# Patient Record
Sex: MALE | Race: WHITE | NOT HISPANIC OR LATINO | Employment: FULL TIME | URBAN - NONMETROPOLITAN AREA
[De-identification: names, ages, dates, MRNs, and addresses within clinical notes are randomized per-mention and may not be internally consistent; named-entity substitution may affect disease eponyms.]

---

## 2022-03-31 ENCOUNTER — OFFICE VISIT (OUTPATIENT)
Dept: FAMILY MEDICINE CLINIC | Facility: CLINIC | Age: 52
End: 2022-03-31

## 2022-03-31 VITALS
BODY MASS INDEX: 26.64 KG/M2 | OXYGEN SATURATION: 99 % | SYSTOLIC BLOOD PRESSURE: 126 MMHG | HEIGHT: 72 IN | DIASTOLIC BLOOD PRESSURE: 75 MMHG | HEART RATE: 66 BPM | WEIGHT: 196.7 LBS

## 2022-03-31 DIAGNOSIS — Z00.00 ROUTINE GENERAL MEDICAL EXAMINATION AT A HEALTH CARE FACILITY: Primary | ICD-10-CM

## 2022-03-31 DIAGNOSIS — G47.33 OBSTRUCTIVE SLEEP APNEA SYNDROME: ICD-10-CM

## 2022-03-31 DIAGNOSIS — J30.9 ALLERGIC RHINITIS, UNSPECIFIED SEASONALITY, UNSPECIFIED TRIGGER: ICD-10-CM

## 2022-03-31 DIAGNOSIS — Z13.220 SCREENING CHOLESTEROL LEVEL: ICD-10-CM

## 2022-03-31 DIAGNOSIS — Z12.5 SCREENING FOR PROSTATE CANCER: ICD-10-CM

## 2022-03-31 DIAGNOSIS — Z12.11 SCREENING FOR COLON CANCER: ICD-10-CM

## 2022-03-31 DIAGNOSIS — S76.212A STRAIN OF LEFT GROIN: ICD-10-CM

## 2022-03-31 PROCEDURE — 99386 PREV VISIT NEW AGE 40-64: CPT | Performed by: FAMILY MEDICINE

## 2022-03-31 PROCEDURE — 36415 COLL VENOUS BLD VENIPUNCTURE: CPT | Performed by: FAMILY MEDICINE

## 2022-03-31 RX ORDER — PSEUDOEPHEDRINE HYDROCHLORIDE 120 MG/1
120 TABLET, FILM COATED, EXTENDED RELEASE ORAL 2 TIMES DAILY
COMMUNITY
Start: 2022-02-21

## 2022-03-31 RX ORDER — FLUTICASONE PROPIONATE 93 UG/1
2 SPRAY, METERED NASAL 2 TIMES DAILY
COMMUNITY
Start: 2022-02-09

## 2022-03-31 RX ORDER — LEVOCETIRIZINE DIHYDROCHLORIDE 5 MG/1
5 TABLET, FILM COATED ORAL DAILY
COMMUNITY
Start: 2022-03-02 | End: 2023-03-09 | Stop reason: SDUPTHER

## 2022-03-31 NOTE — PROGRESS NOTES
"Chief Complaint  NEEDS C PAP ORDER FOR SUPPLIES and PULLED GROIN MUSCLE     Subjective          JUSTUS Gage presents to [facility] for preventative yearly exam.   Patient comes in today basically needing to have his CPAP supplies and states that he is strained his left left groin recently  at home and is still sore.  Patient would like to get his blood work done as well patient states that he did also like to do colonoscopy set up and have some blood work done.      Objective   Vital Signs:   /75   Pulse 66   Ht 182.9 cm (72\")   Wt 89.2 kg (196 lb 11.2 oz)   SpO2 99%   BMI 26.68 kg/m²     Body mass index is 26.68 kg/m².    Predictive Model Details   No score data available for Risk of Fall        PHQ-9 Depression Screening  Little interest or pleasure in doing things? 0-->not at all   Feeling down, depressed, or hopeless? 0-->not at all   Trouble falling or staying asleep, or sleeping too much?     Feeling tired or having little energy?     Poor appetite or overeating?     Feeling bad about yourself - or that you are a failure or have let yourself or your family down?     Trouble concentrating on things, such as reading the newspaper or watching television?     Moving or speaking so slowly that other people could have noticed? Or the opposite - being so fidgety or restless that you have been moving around a lot more than usual?     Thoughts that you would be better off dead, or of hurting yourself in some way?     PHQ-9 Total Score 0   If you checked off any problems, how difficult have these problems made it for you to do your work, take care of things at home, or get along with other people?       Immunization History   Administered Date(s) Administered   • COVID-19 (ULICES) 04/09/2021   • Tdap 06/15/2014       Review of Systems   Constitutional: Negative.    HENT: Negative for congestion, dental problem, ear discharge, ear pain and sore throat.    Respiratory: Negative " for apnea, chest tightness and shortness of breath.    Gastrointestinal: Negative for constipation and nausea.   Endocrine: Negative for polyuria.   Genitourinary: Negative for difficulty urinating.   Musculoskeletal: Negative for arthralgias and gait problem.        Tenderness of the left groin region    Skin: Negative for rash.   Hematological: Negative for adenopathy.       Past History:  Medical History: has a past medical history of Pain in limb, Plantar fascial fibromatosis, and Sleep apnea.   Surgical History: has no past surgical history on file.   Family History: family history includes Cancer in his maternal aunt; Heart disease in his father.   Social History: reports that he has never smoked. He has never used smokeless tobacco. Drug use questions deferred to the physician.      Current Outpatient Medications:   •  EQ Sinus 12-Hour 120 MG 12 hr tablet, Take 120 mg by mouth 2 (Two) Times a Day., Disp: , Rfl:   •  levocetirizine (XYZAL) 5 MG tablet, Take 5 mg by mouth Daily., Disp: , Rfl:   •  Xhance 93 MCG/ACT Exhaler Suspension, 2 sprays by Each Nare route 2 (Two) Times a Day. as directed, Disp: , Rfl:     Allergies: Patient has no known allergies.    Physical Exam  Vitals reviewed.   Constitutional:       Appearance: Normal appearance.   HENT:      Head: Normocephalic.      Right Ear: Tympanic membrane, ear canal and external ear normal.      Left Ear: Tympanic membrane, ear canal and external ear normal.      Nose: Nose normal.      Mouth/Throat:      Pharynx: Oropharynx is clear.   Eyes:      Pupils: Pupils are equal, round, and reactive to light.   Cardiovascular:      Rate and Rhythm: Normal rate and regular rhythm.      Pulses: Normal pulses.   Pulmonary:      Effort: Pulmonary effort is normal.      Breath sounds: Normal breath sounds.   Abdominal:      General: Abdomen is flat. Bowel sounds are normal.      Palpations: Abdomen is soft.   Musculoskeletal:         General: Normal range of motion.       Comments: Left groin tender but no distinct hernias or masses noted in the groin or testicle   Skin:     General: Skin is warm and dry.   Neurological:      General: No focal deficit present.      Mental Status: He is alert and oriented to person, place, and time.          Result Review :                   Assessment and Plan    Diagnoses and all orders for this visit:    1. Routine general medical examination at a health care facility (Primary)  Comments:  Get blood work done today and set up colonoscopy and get PSA as well.  Orders:  -     Comprehensive Metabolic Panel; Future  -     Comprehensive Metabolic Panel    2. Screening cholesterol level  Comments:  We will get lipids and monitor: Discussed basically exercise today diet and physical activities as well.  Orders:  -     Lipid Panel; Future  -     Lipid Panel    3. Screening for prostate cancer  Comments:  We will get PSA and monitor  Orders:  -     PSA Screen; Future  -     PSA Screen    4. Obstructive sleep apnea syndrome  Comments:  Gets new cease Pap supplies ordered for patient  Orders:  -     Generic CPAP Order    5. Allergic rhinitis, unspecified seasonality, unspecified trigger  Comments:  Continue over-the-counter medications at this time    6. Screening for colon cancer  Comments:  Get appointment arranged for colonoscopy  Orders:  -     Ambulatory Referral to Vascular Surgery    7. Strain of left groin  Comments:  Peggy no appreciable hernias noted at this time avoid heavy lifting for a week to 10days ibuprofen if needed and monitor.    Updated annual wellness visit checklist.  Immunizations discussed.  Screening up-to-date.  Recommend yearly dental and eye exams. Also discussed monitoring of blood pressure and lipids.            Follow Up   No follow-ups on file.  Patient was given instructions and counseling regarding his condition or for health maintenance advice. Please see specific information pulled into the AVS if appropriate.     Raul  MD Melony

## 2022-04-01 LAB
ALBUMIN SERPL-MCNC: 5.1 G/DL (ref 4–5)
ALBUMIN/GLOB SERPL: 2.4 {RATIO} (ref 1.2–2.2)
ALP SERPL-CCNC: 85 IU/L (ref 44–121)
ALT SERPL-CCNC: 28 IU/L (ref 0–44)
AST SERPL-CCNC: 23 IU/L (ref 0–40)
BILIRUB SERPL-MCNC: 0.9 MG/DL (ref 0–1.2)
BUN SERPL-MCNC: 21 MG/DL (ref 6–24)
BUN/CREAT SERPL: 21 (ref 9–20)
CALCIUM SERPL-MCNC: 10 MG/DL (ref 8.7–10.2)
CHLORIDE SERPL-SCNC: 104 MMOL/L (ref 96–106)
CHOLEST SERPL-MCNC: 165 MG/DL (ref 100–199)
CO2 SERPL-SCNC: 22 MMOL/L (ref 20–29)
CREAT SERPL-MCNC: 1 MG/DL (ref 0.76–1.27)
EGFRCR SERPLBLD CKD-EPI 2021: 92 ML/MIN/1.73
GLOBULIN SER CALC-MCNC: 2.1 G/DL (ref 1.5–4.5)
GLUCOSE SERPL-MCNC: 98 MG/DL (ref 65–99)
HDLC SERPL-MCNC: 47 MG/DL
LDLC SERPL CALC-MCNC: 102 MG/DL (ref 0–99)
POTASSIUM SERPL-SCNC: 5.1 MMOL/L (ref 3.5–5.2)
PROT SERPL-MCNC: 7.2 G/DL (ref 6–8.5)
PSA SERPL-MCNC: 1.3 NG/ML (ref 0–4)
SODIUM SERPL-SCNC: 142 MMOL/L (ref 134–144)
TRIGL SERPL-MCNC: 85 MG/DL (ref 0–149)
VLDLC SERPL CALC-MCNC: 16 MG/DL (ref 5–40)

## 2022-04-06 ENCOUNTER — TELEPHONE (OUTPATIENT)
Dept: FAMILY MEDICINE CLINIC | Facility: CLINIC | Age: 52
End: 2022-04-06

## 2022-04-06 NOTE — TELEPHONE ENCOUNTER
Florencia is requesting last office notes and labs for patient to be faxed to [facility]. Fax: [phone].

## 2022-04-07 ENCOUNTER — TELEPHONE (OUTPATIENT)
Dept: FAMILY MEDICINE CLINIC | Facility: CLINIC | Age: 52
End: 2022-04-07

## 2022-05-24 ENCOUNTER — OFFICE VISIT (OUTPATIENT)
Dept: FAMILY MEDICINE CLINIC | Facility: CLINIC | Age: 52
End: 2022-05-24

## 2022-05-24 VITALS
HEIGHT: 72 IN | DIASTOLIC BLOOD PRESSURE: 86 MMHG | SYSTOLIC BLOOD PRESSURE: 140 MMHG | BODY MASS INDEX: 26.75 KG/M2 | OXYGEN SATURATION: 98 % | HEART RATE: 74 BPM | WEIGHT: 197.5 LBS

## 2022-05-24 DIAGNOSIS — F33.1 MODERATE EPISODE OF RECURRENT MAJOR DEPRESSIVE DISORDER: Primary | ICD-10-CM

## 2022-05-24 PROBLEM — J30.89 NON-SEASONAL ALLERGIC RHINITIS: Status: ACTIVE | Noted: 2022-05-24

## 2022-05-24 PROCEDURE — 99213 OFFICE O/P EST LOW 20 MIN: CPT | Performed by: PHYSICIAN ASSISTANT

## 2022-05-24 NOTE — PROGRESS NOTES
"Chief Complaint  Depression (X3 months. Patient PHQ9 was a 13 today.)    Subjective          JUSTUS Gage presents to [facility]  History of Present Illness patient states he and his wife are having some issues and he was seeing a therapist who has recommended that he start on an antidepressant.  He admits that he has episodes of feeling \"down\" but he really never thought he needed medication.      Objective     Vital Signs:   /86 (BP Location: Right arm, Patient Position: Sitting)   Pulse 74   Ht 182.9 cm (72\")   Wt 89.6 kg (197 lb 8 oz)   SpO2 98%   BMI 26.79 kg/m²     Body mass index is 26.79 kg/m².    Review of Systems   Constitutional: Negative.    HENT: Negative.    Respiratory: Negative.    Cardiovascular: Negative.    Psychiatric/Behavioral: Positive for sleep disturbance, depressed mood and stress. Negative for agitation, behavioral problems, decreased concentration, dysphoric mood, hallucinations, self-injury, suicidal ideas and negative for hyperactivity. The patient is not nervous/anxious.        Social History: reports that he has never smoked. He has never used smokeless tobacco. He reports current alcohol use of about 1.0 standard drink of alcohol per week. He reports that he does not use drugs.      Current Outpatient Medications:   •  levocetirizine (XYZAL) 5 MG tablet, Take 5 mg by mouth Daily., Disp: , Rfl:   •  EQ Sinus 12-Hour 120 MG 12 hr tablet, Take 120 mg by mouth 2 (Two) Times a Day., Disp: , Rfl:   •  sertraline (ZOLOFT) 50 MG tablet, Take 1 tablet by mouth Daily., Disp: 90 tablet, Rfl: 1  •  Xhance 93 MCG/ACT Exhaler Suspension, 2 sprays by Each Nare route 2 (Two) Times a Day. as directed, Disp: , Rfl:     Allergies: Patient has no known allergies.    Physical Exam  Vitals reviewed.   HENT:      Head: Normocephalic.      Nose: Nose normal.      Mouth/Throat:      Mouth: Mucous membranes are moist.   Eyes:      Pupils: Pupils are equal, round, and " reactive to light.   Cardiovascular:      Rate and Rhythm: Normal rate and regular rhythm.      Pulses: Normal pulses.      Heart sounds: Normal heart sounds.   Pulmonary:      Effort: Pulmonary effort is normal.      Breath sounds: Normal breath sounds.   Abdominal:      General: Abdomen is flat. Bowel sounds are normal.      Palpations: Abdomen is soft.   Musculoskeletal:         General: Normal range of motion.      Cervical back: Normal range of motion and neck supple.   Skin:     General: Skin is warm and dry.      Capillary Refill: Capillary refill takes less than 2 seconds.   Neurological:      General: No focal deficit present.      Mental Status: He is alert and oriented to person, place, and time.   Psychiatric:         Mood and Affect: Mood normal.             Assessment and Plan   Diagnoses and all orders for this visit:    1. Moderate episode of recurrent major depressive disorder (HCC) (Primary)  Assessment & Plan:  Patient's depression is single episode and is mild without psychosis. Their depression is currently active and the condition is newly identified. This will be reassessed in 3 months. F/U as described:patient was prescribed an antidepressant medicine.  He will continue to see the therapist as well.       Other orders  -     sertraline (ZOLOFT) 50 MG tablet; Take 1 tablet by mouth Daily.  Dispense: 90 tablet; Refill: 1          Follow Up   Return in about 3 months (around 8/24/2022) for Recheck.  Patient was given instructions and counseling regarding his condition or for health maintenance advice. Please see specific information pulled into the AVS if appropriate.     Shirley Virk PA-C

## 2022-05-24 NOTE — ASSESSMENT & PLAN NOTE
Patient's depression is single episode and is mild without psychosis. Their depression is currently active and the condition is newly identified. This will be reassessed in 3 months. F/U as described:patient was prescribed an antidepressant medicine.  He will continue to see the therapist as well.

## 2023-01-06 ENCOUNTER — OFFICE VISIT (OUTPATIENT)
Dept: FAMILY MEDICINE CLINIC | Facility: CLINIC | Age: 53
End: 2023-01-06
Payer: COMMERCIAL

## 2023-01-06 VITALS
WEIGHT: 203.9 LBS | SYSTOLIC BLOOD PRESSURE: 132 MMHG | HEART RATE: 92 BPM | BODY MASS INDEX: 27.62 KG/M2 | OXYGEN SATURATION: 97 % | TEMPERATURE: 98.2 F | DIASTOLIC BLOOD PRESSURE: 102 MMHG | HEIGHT: 72 IN

## 2023-01-06 DIAGNOSIS — M25.512 ACUTE PAIN OF LEFT SHOULDER: Primary | ICD-10-CM

## 2023-01-06 PROBLEM — M25.511 ACUTE PAIN OF RIGHT SHOULDER: Status: ACTIVE | Noted: 2023-01-06

## 2023-01-06 PROCEDURE — 99213 OFFICE O/P EST LOW 20 MIN: CPT | Performed by: NURSE PRACTITIONER

## 2023-01-06 RX ORDER — VILAZODONE HYDROCHLORIDE 40 MG/1
TABLET ORAL
COMMUNITY
Start: 2022-11-03 | End: 2023-01-31 | Stop reason: SDUPTHER

## 2023-01-06 RX ORDER — LEVOMEFOLATE CALCIUM 15 MG
TABLET ORAL
COMMUNITY
Start: 2022-11-03

## 2023-01-06 NOTE — LETTER
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 Below are the relevant portions of my assessment and plan of care.    If you have questions, please do not hesitate to call me. I look forward to following Kim along with you.         Sincerely,        RAYSHAWN Donahue        CC: No Recipients  Ai Franklin APRN  01/06/23 1343  Incomplete  Chief Complaint  Work Related Injury ([employer] 11/11/2022. Moving a motor, pulling injury to L shoulder.)    Subjective         Kim Gage presents to [facility]  History of Present Illness This is a work injury from [employer].  DOI: 11/11/22 He was pulling a motor on a pallet and had immediate pain in the left shoulder.  He has no previous history of problems with this shoulder.  He has not taken anything for pain and he has not been icing this. He has the most pain with getting out of bed in the morning. He has not been seen for this and has not had imaging.     Objective    Vital Signs:  BP (!) 132/102 (BP Location: Left arm, Patient Position: Sitting, Cuff Size: Adult)   Pulse 92   Temp 98.2 °F (36.8 °C) (Temporal)   Ht 182.9 cm (72\")   Wt 92.5 kg (203 lb 14.4 oz)   SpO2 97%   BMI 27.65 kg/m²   Estimated body mass index is 27.65 kg/m² as calculated from the following:    Height as of this encounter: 182.9 cm (72\").    Weight as of this encounter: 92.5 kg (203 lb 14.4 oz).          Physical Exam  Musculoskeletal:         General: No swelling or tenderness.      Comments: He is not tender over the right shoulder. He has full rom with pain upon fully lifting his right arm above his shoulder.    Neurological:      Mental Status: He is alert and oriented to person, place, and time.        Result Review :               Assessment and  Plan   Diagnoses and all orders for this visit:    1. Acute pain of right shoulder (Primary)  Assessment & Plan:  Light duty:  No lifting greater than 10 lbs, no pushing or pulling. Take NSAIDS at night    Orders:  -     XR Shoulder 2+ View Right (In Office)          Follow Up   Return in about 1 week (around 1/13/2023) for Recheck.  Patient was given instructions and counseling regarding his condition or for health maintenance advice. Please see specific information pulled into the AVS if appropriate.         Ai Franklin, APRN  01/06/23 1337  Incomplete  Chief Complaint  Work Related Injury ([employer] 11/11/2022. Moving a motor, pulling injury to L shoulder.)    Subjective         Kim Gage presents to [facility]  History of Present Illness This is a work injury from [employer].  DOI: 11/11/22 He was pulling a motor on a pallet and had immediate pain in the left shoulder.  He has no previous history of problems with this shoulder.  He has not taken anything for pain and he has not been icing this. He has the most pain with getting out of bed in the morning. He has not been seen for this and has not had imaging.     Objective    Vital Signs:  BP (!) 132/102 (BP Location: Left arm, Patient Position: Sitting, Cuff Size: Adult)   Pulse 92   Temp 98.2 °F (36.8 °C) (Temporal)   Ht 182.9 cm (72\")   Wt 92.5 kg (203 lb 14.4 oz)   SpO2 97%   BMI 27.65 kg/m²   Estimated body mass index is 27.65 kg/m² as calculated from the following:    Height as of this encounter: 182.9 cm (72\").    Weight as of this encounter: 92.5 kg (203 lb 14.4 oz).          Physical Exam   Result Review :               Assessment and Plan   There are no diagnoses linked to this encounter.        Follow Up   No follow-ups on file.  Patient was given instructions and counseling regarding his condition or for health maintenance advice. Please see specific information pulled into the AVS if appropriate.

## 2023-01-06 NOTE — PROGRESS NOTES
Chief Complaint  Work Related Injury ([employer] 11/11/2022. Moving a motor, pulling injury to L shoulder.)    Subjective        Kim Gage presents to [facility]  History of Present Illness This is a work injury from [employer].  DOI: 11/11/22 He was pulling a motor on a pallet and had immediate pain in the left shoulder.  He has no previous history of problems with this shoulder.  He has not taken anything for pain and he has not been icing this. He has the most pain with getting out of bed in the morning. He has not been seen for this and has not had imaging.     Objective   Vital Signs:  BP (!) 132/102 (BP Location: Left arm, Patient Position: Sitting, Cuff Size: Adult)   Pulse 92   Temp 98.2 °F (36.8 °C) (Temporal)   Ht 182.9 cm (72\")   Wt 92.5 kg (203 lb 14.4 oz)   SpO2 97%   BMI 27.65 kg/m²   Estimated body mass index is 27.65 kg/m² as calculated from the following:    Height as of this encounter: 182.9 cm (72\").    Weight as of this encounter: 92.5 kg (203 lb 14.4 oz).          Physical Exam  Musculoskeletal:         General: No swelling or tenderness.      Comments: He is not tender over the left shoulder. He has full rom with pain upon fully lifting his right arm above his shoulder.    Neurological:      Mental Status: He is alert and oriented to person, place, and time.        Result Review :                Assessment and Plan   Diagnoses and all orders for this visit:    1. Acute pain of left shoulder (Primary)  Assessment & Plan:  Light duty:  No lifting greater than 10 lbs, no pushing or pulling and no over head reaching.      Other orders  -     Cancel: XR Shoulder 2+ View Right (In Office)           Follow Up   Return in about 1 week (around 1/13/2023) for Recheck.  Patient was given instructions and counseling regarding his condition or for health maintenance advice. Please see specific information pulled into the AVS if appropriate.

## 2023-01-10 ENCOUNTER — TELEPHONE (OUTPATIENT)
Dept: FAMILY MEDICINE CLINIC | Facility: CLINIC | Age: 53
End: 2023-01-10
Payer: COMMERCIAL

## 2023-01-10 PROBLEM — M25.512 ACUTE PAIN OF LEFT SHOULDER: Status: ACTIVE | Noted: 2023-01-10

## 2023-01-10 PROBLEM — M25.511 ACUTE PAIN OF RIGHT SHOULDER: Status: RESOLVED | Noted: 2023-01-06 | Resolved: 2023-01-10

## 2023-01-10 NOTE — TELEPHONE ENCOUNTER
It looks like the shoulder the order for the Xray was put in for was the R but the L shoulder was injured and the tech did the Xray of the L shoulder. Can you addend the note from 1/6?    I corrected this and called him.    Ai

## 2023-01-10 NOTE — TELEPHONE ENCOUNTER
Caller: Kim Gage    Relationship to patient: Self    Best call back number: [phone]    Patient is needing: TO CLARIFY THE ISSUES HE IS HAVING IS WITH HIS LEFT SHOULDER.  HE  HAD THE XRAY YESTERDAY OF HIS RIGHT SHOULDER.  HE NEEDS PAPERWORK ADJUSTED TO REFLECT THIS CORRECTION

## 2023-01-13 ENCOUNTER — OFFICE VISIT (OUTPATIENT)
Dept: FAMILY MEDICINE CLINIC | Facility: CLINIC | Age: 53
End: 2023-01-13

## 2023-01-13 VITALS
BODY MASS INDEX: 27.09 KG/M2 | WEIGHT: 200 LBS | HEIGHT: 72 IN | DIASTOLIC BLOOD PRESSURE: 94 MMHG | SYSTOLIC BLOOD PRESSURE: 152 MMHG | HEART RATE: 94 BPM | OXYGEN SATURATION: 97 % | TEMPERATURE: 98.2 F

## 2023-01-13 DIAGNOSIS — M25.512 ACUTE PAIN OF LEFT SHOULDER: Primary | ICD-10-CM

## 2023-01-13 RX ORDER — IBUPROFEN 200 MG
400 TABLET ORAL NIGHTLY
COMMUNITY

## 2023-01-13 NOTE — ASSESSMENT & PLAN NOTE
Light duty:  No lifting greater than 10 lbs, no pushing or pulling. He is to continue to take ibuprofen for pain. I want to see him back in 2 weeks.

## 2023-01-13 NOTE — LETTER
"January 13, 2023     Kim Gage  [address]  [place] [address]    Patient: Kim Gage   YOB: 1970   Date of Visit: 1/13/2023       Dear Dr. Gage:    Thank you for referring Kim Gage to me for evaluation. Below are the relevant portions of my assessment and plan of care.    If you have questions, please do not hesitate to call me. I look forward to following Kim along with you.         Sincerely,        RAYSHAWN Donahue        CC: No Recipients  Ai Franklin APRN  01/13/23 1606  Sign when Signing Visit  Chief Complaint  Work Related Injury (L Shoulder injury f/u)    Subjective         Kim Gage presents to [facility]  History of Present Illness  This is a work injury recheck from [employer]. DOI: 11/11/22. He was pulling a motor on a pallet and had immediate pain. He has increased movement this week.  He has been working light duty since this accident. His x-ray showed mainly chronic changes. He is taking ibuprofen for pain.   Objective    Vital Signs:  /94 (BP Location: Left arm, Patient Position: Sitting, Cuff Size: Adult)   Pulse 94   Temp 98.2 °F (36.8 °C) (Temporal)   Ht 182.9 cm (72\")   Wt 90.7 kg (200 lb)   SpO2 97%   BMI 27.12 kg/m²   Estimated body mass index is 27.12 kg/m² as calculated from the following:    Height as of this encounter: 182.9 cm (72\").    Weight as of this encounter: 90.7 kg (200 lb).             Physical Exam  Musculoskeletal:         General: Tenderness present.      Comments: He has increased ROM of the left shoulder.  He has some pain with lifting above his head.         Result Review :      Data reviewed: reviewed his shoulder x-ray            Assessment and Plan   Diagnoses and all orders for this visit:    1. Acute pain of left shoulder (Primary)  Assessment & Plan:  Light duty:  No lifting greater than 10 lbs, no pushing or pulling. He is to continue to take ibuprofen for pain. I " want to see him back in 2 weeks.             Follow Up   Return in about 2 weeks (around 1/27/2023) for Recheck.  Patient was given instructions and counseling regarding his condition or for health maintenance advice. Please see specific information pulled into the AVS if appropriate.

## 2023-01-26 NOTE — ADDENDUM NOTE
Addended by: NIRMAL DANIELS on: 1/26/2023 03:50 PM     Modules accepted: Level of Service, SmartSet

## 2023-01-31 ENCOUNTER — TELEMEDICINE (OUTPATIENT)
Dept: FAMILY MEDICINE CLINIC | Facility: CLINIC | Age: 53
End: 2023-01-31
Payer: COMMERCIAL

## 2023-01-31 VITALS — WEIGHT: 202 LBS | BODY MASS INDEX: 27.36 KG/M2 | HEIGHT: 72 IN

## 2023-01-31 DIAGNOSIS — I10 PRIMARY HYPERTENSION: Primary | ICD-10-CM

## 2023-01-31 DIAGNOSIS — F33.41 RECURRENT MAJOR DEPRESSIVE DISORDER, IN PARTIAL REMISSION: ICD-10-CM

## 2023-01-31 DIAGNOSIS — G47.33 OSA (OBSTRUCTIVE SLEEP APNEA): ICD-10-CM

## 2023-01-31 PROCEDURE — 99213 OFFICE O/P EST LOW 20 MIN: CPT | Performed by: PHYSICIAN ASSISTANT

## 2023-01-31 RX ORDER — AMLODIPINE BESYLATE 5 MG/1
5 TABLET ORAL DAILY
Qty: 90 TABLET | Refills: 1 | Status: SHIPPED | OUTPATIENT
Start: 2023-01-31

## 2023-01-31 RX ORDER — VILAZODONE HYDROCHLORIDE 40 MG/1
40 TABLET ORAL DAILY
Qty: 90 TABLET | Refills: 3 | Status: SHIPPED | OUTPATIENT
Start: 2023-01-31 | End: 2023-03-01 | Stop reason: SDUPTHER

## 2023-01-31 NOTE — ASSESSMENT & PLAN NOTE
Patient has had elevated blood pressure for several months, I am starting him on Amlodipine 5mg and will follow up in 1 month.  He will monitor his BP at home.

## 2023-01-31 NOTE — ASSESSMENT & PLAN NOTE
Patient's depression is fairly well controlled on his current medication, he does not want to make any adjustments.  I am refilling his current dose of Viibryd.

## 2023-01-31 NOTE — PROGRESS NOTES
"Chief Complaint  Depression (Patient needs a refill on his med.)    Subjective  Patient presents during the COVID-19  pandemic/federally declared state of public health emergency.  For today's visit this provider is located at her home in [place], [place]. Patient was seen today through synchronous audio/video technology. Verbal consent was obtained. The patient was located at home. Vitals signs were not obtained due to lack of home monitoring access. Time spent with patient was 20 minutes.        Kim Gage presents to [facility]  History of Present Illness Patient is being seen today to get a refill of his depression medication which had been prescribed by a provider in [place], he would like to get that from us his PCP.  He also needs a letter so he can get his CPAP supplies, and his BP has been elevated at his recent [facility] doctor visits.  He bought a BP cuff yesterday and  Checked his bp it was 138/102.    Objective   Vital Signs:   Ht 182.9 cm (72\")   Wt 91.6 kg (202 lb) Comment: Pt reported viitals  BMI 27.40 kg/m²     Body mass index is 27.4 kg/m².    Review of Systems   Constitutional: Negative for fatigue.   Eyes: Negative for blurred vision.   Respiratory: Negative for cough, chest tightness and shortness of breath.    Cardiovascular: Negative for chest pain, palpitations and leg swelling.   Gastrointestinal: Negative for abdominal pain, constipation, diarrhea, nausea and vomiting.   Neurological: Negative for dizziness, tremors and headache.   Psychiatric/Behavioral: Negative for depressed mood. The patient is not nervous/anxious.        Past History:  Medical History: has a past medical history of Pain in limb, Plantar fascial fibromatosis, and Sleep apnea.   Surgical History: has no past surgical history on file.   Family History: family history includes Cancer in his maternal aunt; Heart disease in his father.   Social History: reports that he has never " smoked. He has never been exposed to tobacco smoke. He has never used smokeless tobacco. He reports current alcohol use of about 1.0 standard drink per week. He reports that he does not use drugs.      Current Outpatient Medications:   •  EQ Sinus 12-Hour 120 MG 12 hr tablet, Take 120 mg by mouth 2 (Two) Times a Day., Disp: , Rfl:   •  ibuprofen (ADVIL,MOTRIN) 200 MG tablet, Take 400 mg by mouth Every Night., Disp: , Rfl:   •  l-methylfolate 15 MG tablet tablet, , Disp: , Rfl:   •  levocetirizine (XYZAL) 5 MG tablet, Take 5 mg by mouth Daily., Disp: , Rfl:   •  vilazodone (VIIBRYD) 40 MG tablet tablet, Take 1 tablet by mouth Daily., Disp: 90 tablet, Rfl: 3  •  Xhance 93 MCG/ACT Exhaler Suspension, 2 sprays by Each Nare route 2 (Two) Times a Day. as directed, Disp: , Rfl:   •  amLODIPine (NORVASC) 5 MG tablet, Take 1 tablet by mouth Daily., Disp: 90 tablet, Rfl: 1    Allergies: Patient has no known allergies.    Physical Exam  Constitutional:       Appearance: Normal appearance.   Neurological:      Mental Status: He is alert and oriented to person, place, and time.   Psychiatric:         Mood and Affect: Mood normal.         Behavior: Behavior normal.          Result Review :                   Assessment and Plan    Diagnoses and all orders for this visit:    1. Primary hypertension (Primary)  Assessment & Plan:  Patient has had elevated blood pressure for several months, I am starting him on Amlodipine 5mg and will follow up in 1 month.  He will monitor his BP at home.       2. Recurrent major depressive disorder, in partial remission (HCC)  Assessment & Plan:  Patient's depression is fairly well controlled on his current medication, he does not want to make any adjustments.  I am refilling his current dose of Viibryd.       3. AVANI (obstructive sleep apnea)  Assessment & Plan:  I will send him a letter to get his CPAP supplies.       Other orders  -     vilazodone (VIIBRYD) 40 MG tablet tablet; Take 1 tablet by  mouth Daily.  Dispense: 90 tablet; Refill: 3  -     amLODIPine (NORVASC) 5 MG tablet; Take 1 tablet by mouth Daily.  Dispense: 90 tablet; Refill: 1      Follow Up   No follow-ups on file.  Patient was given instructions and counseling regarding his condition or for health maintenance advice. Please see specific information pulled into the AVS if appropriate.     Shirley Virk PA-C

## 2023-02-08 ENCOUNTER — TELEPHONE (OUTPATIENT)
Dept: FAMILY MEDICINE CLINIC | Facility: CLINIC | Age: 53
End: 2023-02-08

## 2023-02-08 NOTE — TELEPHONE ENCOUNTER
Caller: Kim Gage    Relationship: Self    Best call back number: [phone]    What medications are you currently taking:   Current Outpatient Medications on File Prior to Visit   Medication Sig Dispense Refill   • amLODIPine (NORVASC) 5 MG tablet Take 1 tablet by mouth Daily. 90 tablet 1   • EQ Sinus 12-Hour 120 MG 12 hr tablet Take 120 mg by mouth 2 (Two) Times a Day.     • ibuprofen (ADVIL,MOTRIN) 200 MG tablet Take 400 mg by mouth Every Night.     • l-methylfolate 15 MG tablet tablet      • levocetirizine (XYZAL) 5 MG tablet Take 5 mg by mouth Daily.     • vilazodone (VIIBRYD) 40 MG tablet tablet Take 1 tablet by mouth Daily. 90 tablet 3   • Xhance 93 MCG/ACT Exhaler Suspension 2 sprays by Each Nare route 2 (Two) Times a Day. as directed       No current facility-administered medications on file prior to visit.      Which medication are you concerned about: vilazodone (VIIBRYD) 40 MG tablet tablet    Who prescribed you this medication: LATONYA    What are your concerns: MEDICATION NEEDS A PRE-AUTHORIZATION.  PATIENT GAVE [phone] AS A NUMBER TO CALL

## 2023-02-10 ENCOUNTER — TELEPHONE (OUTPATIENT)
Dept: FAMILY MEDICINE CLINIC | Facility: CLINIC | Age: 53
End: 2023-02-10
Payer: COMMERCIAL

## 2023-03-01 RX ORDER — VILAZODONE HYDROCHLORIDE 40 MG/1
40 TABLET ORAL DAILY
Qty: 90 TABLET | Refills: 3 | Status: SHIPPED | OUTPATIENT
Start: 2023-03-01

## 2023-03-09 ENCOUNTER — OFFICE VISIT (OUTPATIENT)
Dept: FAMILY MEDICINE CLINIC | Facility: CLINIC | Age: 53
End: 2023-03-09
Payer: COMMERCIAL

## 2023-03-09 VITALS
WEIGHT: 207.7 LBS | HEIGHT: 72 IN | OXYGEN SATURATION: 94 % | HEART RATE: 110 BPM | DIASTOLIC BLOOD PRESSURE: 92 MMHG | BODY MASS INDEX: 28.13 KG/M2 | SYSTOLIC BLOOD PRESSURE: 132 MMHG

## 2023-03-09 DIAGNOSIS — H93.13 TINNITUS OF BOTH EARS: ICD-10-CM

## 2023-03-09 DIAGNOSIS — J32.4 CHRONIC PANSINUSITIS: ICD-10-CM

## 2023-03-09 DIAGNOSIS — L84 CORNS/CALLOSITIES: ICD-10-CM

## 2023-03-09 DIAGNOSIS — J30.1 SEASONAL ALLERGIC RHINITIS DUE TO POLLEN: ICD-10-CM

## 2023-03-09 DIAGNOSIS — I10 PRIMARY HYPERTENSION: Primary | ICD-10-CM

## 2023-03-09 DIAGNOSIS — F33.41 RECURRENT MAJOR DEPRESSIVE DISORDER, IN PARTIAL REMISSION: ICD-10-CM

## 2023-03-09 PROCEDURE — 99214 OFFICE O/P EST MOD 30 MIN: CPT | Performed by: FAMILY MEDICINE

## 2023-03-09 RX ORDER — MONTELUKAST SODIUM 10 MG/1
10 TABLET ORAL NIGHTLY
Qty: 90 TABLET | Refills: 1 | Status: SHIPPED | OUTPATIENT
Start: 2023-03-09

## 2023-03-09 RX ORDER — LEVOCETIRIZINE DIHYDROCHLORIDE 5 MG/1
5 TABLET, FILM COATED ORAL DAILY
Qty: 90 TABLET | Refills: 1 | Status: SHIPPED | OUTPATIENT
Start: 2023-03-09

## 2023-03-09 NOTE — ASSESSMENT & PLAN NOTE
Would like referral back to Dr. Rojo with [facility] orthopedics regarding recurrent foot pain problems and corns

## 2023-03-09 NOTE — ASSESSMENT & PLAN NOTE
Discussed normal TM exam today.  He will set up consultation with ENT for further hearing evaluation and tinnitus work-up.  Discussed not taking NSAIDs to see if this may be contributing

## 2023-03-09 NOTE — ASSESSMENT & PLAN NOTE
Discussed blood pressure elevation today.  He will restart home checks and let us know if it staying over 140/90 for dose adjustment.

## 2023-03-09 NOTE — PROGRESS NOTES
"Chief Complaint  Tinnitus (Ringing in ears for years)    Subjective    History of Present Illness:  Kim Gage is a 52 y.o. male who presents today for problems with left greater than right ear ringing sensation.    Ongoing for the past several years.    On amlodipine for hypertension.  Reports home blood pressure checks usually in the 120s over 80s but occasionally at 90 diastolic.  He will restart home blood pressure checks given mild elevation today let us know if this is staying over 140/90.    On Xyzal and would like to restart Singulair for allergies.  Plans to see ENT for chronic sinusitis problems.    Mood stable with Viibryd.    Objective   Vital Signs:   /92   Pulse 110   Ht 182.9 cm (72\")   Wt 94.2 kg (207 lb 11.2 oz)   SpO2 94%   BMI 28.17 kg/m²     Review of Systems   Constitutional: Negative for appetite change, chills and fever.   HENT: Positive for congestion and tinnitus. Negative for hearing loss.    Eyes: Negative for blurred vision.   Respiratory: Negative for chest tightness.    Cardiovascular: Negative for chest pain.   Gastrointestinal: Negative for abdominal pain.   Musculoskeletal: Negative for gait problem.   Skin: Negative for rash.        Recurrent foot corns and callosities   Psychiatric/Behavioral: Negative for depressed mood.       Past History:  Medical History: has a past medical history of Pain in limb, Plantar fascial fibromatosis, and Sleep apnea.   Surgical History: has no past surgical history on file.   Family History: family history includes Cancer in his maternal aunt; Heart disease in his father.   Social History: reports that he has never smoked. He has never been exposed to tobacco smoke. He has never used smokeless tobacco. He reports current alcohol use of about 1.0 standard drink per week. He reports that he does not use drugs.      Current Outpatient Medications:   •  amLODIPine (NORVASC) 5 MG tablet, Take 1 tablet by mouth Daily., Disp: 90 tablet, " Rfl: 1  •  ibuprofen (ADVIL,MOTRIN) 200 MG tablet, Take 2 tablets by mouth Every Night., Disp: , Rfl:   •  l-methylfolate 15 MG tablet tablet, , Disp: , Rfl:   •  levocetirizine (XYZAL) 5 MG tablet, Take 1 tablet by mouth Daily., Disp: 90 tablet, Rfl: 1  •  vilazodone (VIIBRYD) 40 MG tablet tablet, Take 1 tablet by mouth Daily., Disp: 90 tablet, Rfl: 3  •  Xhance 93 MCG/ACT Exhaler Suspension, 2 sprays by Each Nare route 2 (Two) Times a Day. as directed, Disp: , Rfl:   •  EQ Sinus 12-Hour 120 MG 12 hr tablet, Take 1 tablet by mouth 2 (Two) Times a Day., Disp: , Rfl:   •  montelukast (Singulair) 10 MG tablet, Take 1 tablet by mouth Every Night., Disp: 90 tablet, Rfl: 1    Allergies: Patient has no known allergies.    Physical Exam  Constitutional:       Appearance: Normal appearance.   HENT:      Head: Normocephalic.      Right Ear: Tympanic membrane, ear canal and external ear normal.      Left Ear: Tympanic membrane, ear canal and external ear normal.      Nose: Nose normal.   Eyes:      Pupils: Pupils are equal, round, and reactive to light.   Cardiovascular:      Rate and Rhythm: Normal rate and regular rhythm.      Heart sounds: Normal heart sounds.   Pulmonary:      Effort: Pulmonary effort is normal.      Breath sounds: Normal breath sounds.   Musculoskeletal:         General: Normal range of motion.      Cervical back: Normal range of motion.   Skin:     Comments: Bilateral corns   Neurological:      General: No focal deficit present.      Mental Status: He is alert.   Psychiatric:         Mood and Affect: Mood normal.         Behavior: Behavior normal.         Thought Content: Thought content normal.          Result Review                   Assessment and Plan  Diagnoses and all orders for this visit:    1. Primary hypertension (Primary)  Assessment & Plan:  Discussed blood pressure elevation today.  He will restart home checks and let us know if it staying over 140/90 for dose adjustment.      2. Recurrent  major depressive disorder, in partial remission (HCC)  Assessment & Plan:  Patient's depression is recurrent and is mild without psychosis. Their depression is currently in full remission and the condition is improving with treatment. This will be reassessed at the next regular appointment. F/U as described:patient will continue current medication therapy.      3. Tinnitus of both ears  Assessment & Plan:  Discussed normal TM exam today.  He will set up consultation with ENT for further hearing evaluation and tinnitus work-up.  Discussed not taking NSAIDs to see if this may be contributing      4. Chronic pansinusitis  Assessment & Plan:  Restarted Singulair with Xyzal.  Consultation planned with ENT for sinus surgery options    Orders:  -     levocetirizine (XYZAL) 5 MG tablet; Take 1 tablet by mouth Daily.  Dispense: 90 tablet; Refill: 1  -     montelukast (Singulair) 10 MG tablet; Take 1 tablet by mouth Every Night.  Dispense: 90 tablet; Refill: 1    5. Seasonal allergic rhinitis due to pollen  -     levocetirizine (XYZAL) 5 MG tablet; Take 1 tablet by mouth Daily.  Dispense: 90 tablet; Refill: 1  -     montelukast (Singulair) 10 MG tablet; Take 1 tablet by mouth Every Night.  Dispense: 90 tablet; Refill: 1    6. Corns/callosities  Assessment & Plan:  Would like referral back to Dr. Rojo with [facility] orthopedics regarding recurrent foot pain problems and corns    Orders:  -     Ambulatory Referral to Podiatry      BMI is >= 25 and <30. (Overweight) The following options were offered after discussion;: exercise counseling/recommendations and nutrition counseling/recommendations          Follow Up  Return if symptoms worsen or fail to improve.    Stephen Cesar MD  Answers for HPI/ROS submitted by the patient on 3/9/2023  What is the primary reason for your visit?: Other  Please describe your symptoms.: Ringing in ears  Have you had these symptoms before?: Yes  How long have you been having these  symptoms?: Greater than 2 weeks

## 2023-03-23 ENCOUNTER — TELEPHONE (OUTPATIENT)
Dept: FAMILY MEDICINE CLINIC | Facility: CLINIC | Age: 53
End: 2023-03-23

## 2023-03-23 NOTE — TELEPHONE ENCOUNTER
Caller: Kim Gage    Relationship: Self    Best call back number: [phone]    What is the best time to reach you: ANYTIME    Who are you requesting to speak with (clinical staff, provider,  specific staff member): CLINICAL     What was the call regarding: REFERRAL. HE NEEDS TO KNOW WHERE TO GO. HE'S CONFUSED. PLEASE ADVISE PATIENT TODAY, IF POSSIBLE.    Do you require a callback: YES, PLEASE

## 2023-05-04 ENCOUNTER — OFFICE VISIT (OUTPATIENT)
Dept: FAMILY MEDICINE CLINIC | Facility: CLINIC | Age: 53
End: 2023-05-04
Payer: COMMERCIAL

## 2023-05-04 VITALS — WEIGHT: 207 LBS | HEIGHT: 72 IN | BODY MASS INDEX: 28.04 KG/M2

## 2023-05-22 ENCOUNTER — OFFICE VISIT (OUTPATIENT)
Dept: BEHAVIORAL HEALTH | Facility: CLINIC | Age: 53
End: 2023-05-22
Payer: COMMERCIAL

## 2023-05-22 VITALS
SYSTOLIC BLOOD PRESSURE: 130 MMHG | HEIGHT: 72 IN | HEART RATE: 91 BPM | DIASTOLIC BLOOD PRESSURE: 98 MMHG | BODY MASS INDEX: 28.12 KG/M2 | WEIGHT: 207.6 LBS

## 2023-05-22 DIAGNOSIS — F33.1 MODERATE EPISODE OF RECURRENT MAJOR DEPRESSIVE DISORDER: Primary | ICD-10-CM

## 2023-05-22 DIAGNOSIS — F41.1 GENERALIZED ANXIETY DISORDER: ICD-10-CM

## 2023-05-22 PROCEDURE — 90792 PSYCH DIAG EVAL W/MED SRVCS: CPT | Performed by: NURSE PRACTITIONER

## 2023-05-22 RX ORDER — BUSPIRONE HYDROCHLORIDE 7.5 MG/1
1 TABLET ORAL EVERY 12 HOURS SCHEDULED
COMMUNITY
Start: 2023-03-30 | End: 2023-05-22

## 2023-05-22 RX ORDER — FLUOXETINE 10 MG/1
10 CAPSULE ORAL DAILY
Qty: 30 CAPSULE | Refills: 0 | Status: SHIPPED | OUTPATIENT
Start: 2023-05-22 | End: 2024-05-21

## 2023-05-22 RX ORDER — AMLODIPINE BESYLATE 10 MG/1
1 TABLET ORAL DAILY
COMMUNITY
Start: 2023-04-18

## 2023-05-22 RX ORDER — BUPROPION HYDROCHLORIDE 150 MG/1
150 TABLET ORAL EVERY MORNING
COMMUNITY
Start: 2023-04-17 | End: 2023-05-22

## 2023-05-22 RX ORDER — MIRTAZAPINE 15 MG/1
15 TABLET, FILM COATED ORAL
COMMUNITY
Start: 2023-04-17

## 2023-05-22 RX ORDER — ARIPIPRAZOLE 5 MG/1
1 TABLET ORAL DAILY
COMMUNITY
Start: 2023-03-22 | End: 2023-05-22 | Stop reason: SINTOL

## 2023-05-22 RX ORDER — AMMONIUM LACTATE 12 G/100G
LOTION TOPICAL EVERY 12 HOURS SCHEDULED
COMMUNITY

## 2023-05-22 RX ORDER — OMEPRAZOLE 20 MG/1
CAPSULE, DELAYED RELEASE ORAL
COMMUNITY
Start: 2023-05-05

## 2023-05-22 NOTE — PROGRESS NOTES
New Patient Office Visit      Patient Name: Kim Gaeg  : 1970   MRN: [id number]     Referring Provider: Shirley Virk PA-C    Chief Complaint:      ICD-10-CM ICD-9-CM   1. Moderate episode of recurrent major depressive disorder  F33.1 296.32   2. Generalized anxiety disorder  F41.1 300.02        History of Present Illness:   Kim Gage is a 52 y.o. male who is here today for medication management of depression and anxiety.  Current medication is failing.    Medications: wellbutrin 150mg daily  Therapy: marriage/couples    Subjective      Patient reports current medications failing to improve emotional breakdowns, sadness, depressive symptoms, and increasing anxieties related to marriage separation and rearing of son. Patient is reporting lack of motivation, energy, loss of interest in things he enjoys, disturbed sleep, recent weight gain.  Patient states he and his wife will be starting therapy with a new couples therapist soon.  Patient moved out of the home due to a marriage breakdown 1 year ago and has been struggling emotionally since then.  Patient reports having a Aductionsight test done but doesn't have the results yet, the doctor did call him and tell him to stop the aripiprazole due to the report.  Patient would like to go ahead and start another medication rather than wait on the report because it could be a month.  Patient feels a lot of guilt related to son, feels he and his wife have enabled him to avoid responsibility and maintains a low motivation toward life goals/milestones.    Review of Systems:   Review of Systems   Psychiatric/Behavioral: Positive for dysphoric mood, sleep disturbance and stress. The patient is nervous/anxious.        Past Medical History:   Past Medical History:   Diagnosis Date   • Depression    • Pain in limb    • Plantar fascial fibromatosis    • Sleep apnea        Past Surgical History:   History reviewed. No pertinent surgical history.    Family  History:   Family History   Problem Relation Age of Onset   • Heart disease Father    • Cancer Maternal Aunt        Family Psychiatric History:  Denies    Screening Scores:   PHQ-9 : 14    Psychiatric History:     Previous medications: sertraline, buspirone  Inpatient admissions: no  History of suicide/self harm attempts: denies  Trauma/Abuse History: denies  Developmental History: wnl    RISK ASSESSMENT:    Does patient have intent for suicide? denies  Does patient have thoughts of suicide? denies  Does patient have a current plan for suicide? denies  Access to firearms or weapons: n/a  History of suicide attempts: denies  History of homicidal ideation: denies  Family history of suicide or attempts: denies  Risk Taking/Impulsive Behavior (current or past): denies Describe: None   Protective factors: n/a    Social History:    Living situation: lives alone  Patient's Occupation: woodshop business  Leisure and Recreation: woodworking, spending time with friends and family, outdoor activities  Support system: wife  Illicit substance use: denies  Alcohol use: social  Tobacco use: did not assess    Medications:     Current Outpatient Medications:   •  amLODIPine (NORVASC) 10 MG tablet, Take 1 tablet by mouth Daily., Disp: , Rfl:   •  ammonium lactate (LAC-HYDRIN) 12 % lotion, Every 12 (Twelve) Hours., Disp: , Rfl:   •  buPROPion XL (WELLBUTRIN XL) 150 MG 24 hr tablet, Take 1 tablet by mouth Every Morning., Disp: , Rfl:   •  EQ Sinus 12-Hour 120 MG 12 hr tablet, Take 1 tablet by mouth 2 (Two) Times a Day., Disp: , Rfl:   •  l-methylfolate 15 MG tablet tablet, Take 1 tablet by mouth Daily., Disp: , Rfl:   •  levocetirizine (XYZAL) 5 MG tablet, Take 1 tablet by mouth Daily., Disp: 90 tablet, Rfl: 1  •  mirtazapine (REMERON) 15 MG tablet, Take 1 tablet by mouth every night at bedtime., Disp: , Rfl:   •  montelukast (Singulair) 10 MG tablet, Take 1 tablet by mouth Every Night., Disp: 90 tablet, Rfl: 1  •  omeprazole  "(priLOSEC) 20 MG capsule, TAKE 1 CAPSULE BY MOUTH ONCE DAILY AS DIRECTED 30 MINUTES BEFORE BREAKFAST, Disp: , Rfl:   •  Xhance 93 MCG/ACT Exhaler Suspension, 2 sprays by Each Nare route 2 (Two) Times a Day. as directed, Disp: , Rfl:     Medication Considerations:  DANIS reviewed and appropriate.      Allergies:   No Known Allergies    Objective     Physical Exam:  Vital Signs:   Vitals:    05/22/23 1422   BP: 130/98   Pulse: 91   Weight: 94.2 kg (207 lb 9.6 oz)   Height: 182.9 cm (72\")     Body mass index is 28.16 kg/m².     Mental Status Exam:   Hygiene:   good  Cooperation:  Cooperative  Eye Contact:  Good  Psychomotor Behavior:  Appropriate  Affect:  Appropriate  Mood: sad  Speech:  Normal  Thought Process:  Goal directed and Linear  Thought Content:  Normal  Suicidal:  None  Homicidal:  None  Hallucinations:  None  Delusion:  None  Memory:  Intact  Orientation:  Grossly intact  Reliability:  good  Insight:  Good  Judgement:  Good  Impulse Control:  Good     Therapeutic communications related to relationship, family.       Assessment / Plan      Visit Diagnosis/Orders Placed This Visit:  Diagnoses and all orders for this visit:    1. Moderate episode of recurrent major depressive disorder (Primary)    2. Generalized anxiety disorder         Functional Status: Mild impairment     Prognosis: Good with Ongoing Treatment     Impression/Formulation:  Patient appeared alert and oriented.  Patient is voluntarily requesting to begin outpatient therapy at [facility].  Patient is receptive to assistance with maintaining a stable lifestyle.  Patient presents with history of     ICD-10-CM ICD-9-CM   1. Moderate episode of recurrent major depressive disorder  F33.1 296.32   2. Generalized anxiety disorder  F41.1 300.02   .     Treatment Plan:  Wean off wellbutrin xl-schedule provided.  Start fluoxetine.  Referred for individual therapy to [facility].  Start couples therapy with wife.  Patient " will continue supportive psychotherapy efforts and medications as indicated. Clinic will obtain release of information for current treatment team for continuity of care as needed. Patient will contact this office, call 911 or present to the nearest emergency room should suicidal or homicidal ideations occur. Discussed medication options and treatment plan of prescribed medication(s) as well as the risks, benefits, and potential side effects. Patient ackowledged and verbally consented to continue with current treatment plan and was educated on the importance of compliance with treatment and follow-up appointments.     Follow Up:   Return in about 4 weeks (around 6/19/2023) for Med Check.        RAYSHAWN Daly, ALCIDESP-BC  [facility]     This is electronically signed by RAYSHAWN Daly, ALPHONSO  [unfilled] 15:45 EDT

## 2023-06-15 ENCOUNTER — OFFICE VISIT (OUTPATIENT)
Dept: BEHAVIORAL HEALTH | Facility: CLINIC | Age: 53
End: 2023-06-15
Payer: COMMERCIAL

## 2023-06-15 VITALS — HEART RATE: 103 BPM | DIASTOLIC BLOOD PRESSURE: 108 MMHG | SYSTOLIC BLOOD PRESSURE: 156 MMHG

## 2023-06-15 DIAGNOSIS — F33.1 MODERATE EPISODE OF RECURRENT MAJOR DEPRESSIVE DISORDER: ICD-10-CM

## 2023-06-15 DIAGNOSIS — F41.1 GENERALIZED ANXIETY DISORDER: ICD-10-CM

## 2023-06-15 RX ORDER — FLUOXETINE 10 MG/1
10 CAPSULE ORAL DAILY
Qty: 30 CAPSULE | Refills: 0 | Status: SHIPPED | OUTPATIENT
Start: 2023-06-15 | End: 2024-06-14

## 2023-06-15 NOTE — PROGRESS NOTES
Follow Up Office Visit      Patient Name: Kim Gage  : 1970   MRN: [id number]     Referring Provider: Shirley Virk PA-C    Chief Complaint:      ICD-10-CM ICD-9-CM   1. Generalized anxiety disorder  F41.1 300.02   2. Moderate episode of recurrent major depressive disorder  F33.1 296.32        History of Present Illness:   Kim Gage is a 53 y.o. male who is here today for medication follow up.  Subjective      Patient Reports: I feel like I am doing better.  Less emotional, no breakdowns, Aleena and I are working through things, seeing the couples counselor.  Doing individual and couples therapy.  Started seeing Donna at [facility].  Still lacking some drive to do things. Don't have my friend group I used to have but I am more focused on my son and Aleena right now. Sleep seems to be ok, I will wake up around 3am but am able to go back to sleep.      Review of Systems:   Review of Systems   Psychiatric/Behavioral:  Positive for stress. The patient is nervous/anxious.      Screening Scores:   None today    RISK ASSESSMENT:  Patient denies any thoughts or intent of suicide today. Patient denies any impulsive behavior today.     Medications:     Current Outpatient Medications:     FLUoxetine (PROzac) 10 MG capsule, Take 1 capsule by mouth Daily., Disp: 30 capsule, Rfl: 0    amLODIPine (NORVASC) 10 MG tablet, Take 1 tablet by mouth Daily., Disp: , Rfl:     ammonium lactate (LAC-HYDRIN) 12 % lotion, Every 12 (Twelve) Hours., Disp: , Rfl:     EQ Sinus 12-Hour 120 MG 12 hr tablet, Take 1 tablet by mouth 2 (Two) Times a Day., Disp: , Rfl:     l-methylfolate 15 MG tablet tablet, Take 1 tablet by mouth Daily., Disp: , Rfl:     levocetirizine (XYZAL) 5 MG tablet, Take 1 tablet by mouth Daily., Disp: 90 tablet, Rfl: 1    mirtazapine (REMERON) 15 MG tablet, Take 1 tablet by mouth every night at bedtime., Disp: , Rfl:     montelukast (Singulair) 10 MG tablet, Take 1 tablet by mouth Every Night.,  Disp: 90 tablet, Rfl: 1    omeprazole (priLOSEC) 20 MG capsule, TAKE 1 CAPSULE BY MOUTH ONCE DAILY AS DIRECTED 30 MINUTES BEFORE BREAKFAST, Disp: , Rfl:     Xhance 93 MCG/ACT Exhaler Suspension, 2 sprays by Each Nare route 2 (Two) Times a Day. as directed, Disp: , Rfl:     Medication Considerations:  DANIS reviewed and appropriate.      Allergies:   No Known Allergies    Results Reviewed:   None today     Objective     Physical Exam:  Vital Signs:   Vitals:    06/15/23 1529   BP: (!) 156/108   Pulse: 103     There is no height or weight on file to calculate BMI.     Mental Status Exam:   Hygiene:   good  Cooperation:  Cooperative  Eye Contact:  Good  Psychomotor Behavior:  Appropriate  Affect:  Appropriate  Mood: anxious  Speech:  Pressured  Thought Process:  Goal directed and Linear  Thought Content:  Normal  Suicidal:  None  Homicidal:  None  Hallucinations:  None  Delusion:  None  Memory:  Intact  Orientation:  Grossly intact  Reliability:  good  Insight:  Good  Judgement:  Good  Impulse Control:  Good  Physical/Medical Issues:  No      Assessment / Plan      Visit Diagnosis/Orders Placed This Visit:  Diagnoses and all orders for this visit:    1. Generalized anxiety disorder  -     FLUoxetine (PROzac) 10 MG capsule; Take 1 capsule by mouth Daily.  Dispense: 30 capsule; Refill: 0    2. Moderate episode of recurrent major depressive disorder  -     FLUoxetine (PROzac) 10 MG capsule; Take 1 capsule by mouth Daily.  Dispense: 30 capsule; Refill: 0         Functional Status: No impairment    Prognosis: Good with Ongoing Treatment     Impression/Formulation:  Patient appeared alert and oriented.  Patient is receptive to assistance with maintaining a stable lifestyle.  Patient presents with history of     ICD-10-CM ICD-9-CM   1. Generalized anxiety disorder  F41.1 300.02   2. Moderate episode of recurrent major depressive disorder  F33.1 296.32     Patient has seen an improvement with emotions and mood, although  motivation is still lacking.  He does not feel he needs an increase of medication at this time. Patient is proactively working with an individual and couples therapist.    Treatment Plan:     Continue fluoxetine.  Continue couples and individual therapy.  Patient will continue supportive psychotherapy efforts and medications as indicated. Clinic will obtain release of information for current treatment team for continuity of care as needed. Patient will contact this office, call 911 or present to the nearest emergency room should suicidal or homicidal ideations occur.  Discussed medication options and treatment plan of prescribed medication(s) as well as the risks, benefits, and potential side effects. Patient ackowledged and verbally consented to continue with current treatment plan and was educated on the importance of compliance with treatment and follow-up appointments.     Follow Up:   Return in about 4 weeks (around 7/13/2023) for Med Check.        RAYSHAWN Daly, ALCIDESP-BC  [facility]     This is electronically signed by RAYSHAWN Daly, ALPHONSO  [unfilled] 15:37 EDT

## 2023-10-11 DIAGNOSIS — J30.1 SEASONAL ALLERGIC RHINITIS DUE TO POLLEN: ICD-10-CM

## 2023-10-11 DIAGNOSIS — J32.4 CHRONIC PANSINUSITIS: ICD-10-CM

## 2023-10-12 RX ORDER — LEVOCETIRIZINE DIHYDROCHLORIDE 5 MG/1
5 TABLET, FILM COATED ORAL DAILY
Qty: 90 TABLET | Refills: 0 | Status: SHIPPED | OUTPATIENT
Start: 2023-10-12

## 2024-01-15 ENCOUNTER — OFFICE VISIT (OUTPATIENT)
Dept: BEHAVIORAL HEALTH | Facility: CLINIC | Age: 54
End: 2024-01-15
Payer: COMMERCIAL

## 2024-01-15 VITALS — HEART RATE: 96 BPM | SYSTOLIC BLOOD PRESSURE: 138 MMHG | OXYGEN SATURATION: 97 % | DIASTOLIC BLOOD PRESSURE: 88 MMHG

## 2024-01-15 DIAGNOSIS — F41.1 GENERALIZED ANXIETY DISORDER: Primary | ICD-10-CM

## 2024-01-15 DIAGNOSIS — F33.42 RECURRENT MAJOR DEPRESSIVE DISORDER, IN FULL REMISSION: ICD-10-CM

## 2024-01-15 DIAGNOSIS — G47.20 CIRCADIAN RHYTHM SLEEP DISORDER, UNSPECIFIED TYPE: ICD-10-CM

## 2024-01-15 PROCEDURE — 99214 OFFICE O/P EST MOD 30 MIN: CPT | Performed by: NURSE PRACTITIONER

## 2024-01-15 RX ORDER — MIRTAZAPINE 15 MG/1
15 TABLET, FILM COATED ORAL NIGHTLY PRN
Qty: 30 TABLET | Refills: 1 | Status: SHIPPED | OUTPATIENT
Start: 2024-01-15

## 2024-01-15 NOTE — PROGRESS NOTES
Follow Up Office Visit      Patient Name: Kim Gage  : 1970   MRN: [id number]     Referring Provider: Shirley Virk PA-C    Chief Complaint:      ICD-10-CM ICD-9-CM   1. Generalized anxiety disorder  F41.1 300.02   2. Circadian rhythm sleep disorder, unspecified type  G47.20 327.30   3. Recurrent major depressive disorder, in full remission  F33.42 296.36        History of Present Illness:   Kim Gage is a 53 y.o. male who is here today for follow up with psychiatric medications.    Subjective      Patient Reports:     Doing mostly fine without the fluoxetine.  I ran out last October, weaned myself off, things had improved at home so I didn't think I really needed it anymore and thought I would just try to go without.  Have had some emotional incidents but overall doing well.  Planning to start seeing Edmund Neves again for therapy  Moved back home, relationship with wife is going way better than it used to be.  Son is a source of stress, I can only do so much.   Still taking the sleep med on an as needed but haven't needed lately.    Review of Systems:   Review of Systems   Psychiatric/Behavioral:  Positive for stress.      RISK ASSESSMENT:  Patient denies any high risk factors today.    Medications:     Current Outpatient Medications:     amLODIPine (NORVASC) 10 MG tablet, Take 1 tablet by mouth Daily., Disp: , Rfl:     ammonium lactate (LAC-HYDRIN) 12 % lotion, Every 12 (Twelve) Hours., Disp: , Rfl:     EQ Sinus 12-Hour 120 MG 12 hr tablet, Take 1 tablet by mouth 2 (Two) Times a Day., Disp: , Rfl:     l-methylfolate 15 MG tablet tablet, Take 1 tablet by mouth Daily., Disp: , Rfl:     levocetirizine (XYZAL) 5 MG tablet, Take 1 tablet by mouth once daily, Disp: 90 tablet, Rfl: 0    mirtazapine (REMERON) 15 MG tablet, Take 1 tablet by mouth At Night As Needed (sleep)., Disp: 30 tablet, Rfl: 1    montelukast (Singulair) 10 MG tablet, Take 1 tablet by mouth Every Night., Disp: 90 tablet,  Rfl: 1    omeprazole (priLOSEC) 20 MG capsule, TAKE 1 CAPSULE BY MOUTH ONCE DAILY AS DIRECTED 30 MINUTES BEFORE BREAKFAST, Disp: , Rfl:     Xhance 93 MCG/ACT Exhaler Suspension, 2 sprays by Each Nare route 2 (Two) Times a Day. as directed, Disp: , Rfl:     Medication Considerations:  DANIS reviewed and appropriate.      Allergies:   No Known Allergies    Results Reviewed:   PHQ 2:0     Objective     Physical Exam:  Vital Signs:   Vitals:    01/15/24 0855   BP: 138/88   Pulse: 96   SpO2: 97%     There is no height or weight on file to calculate BMI.     Mental Status Exam:   Hygiene:   good  Cooperation:  Cooperative  Eye Contact:  Good  Psychomotor Behavior:  Appropriate  Affect:  Appropriate  Mood: normal  Speech:  Normal  Thought Process:  Goal directed and Linear  Thought Content:  Normal  Suicidal:  None  Homicidal:  None  Hallucinations:  None  Delusion:  None  Memory:  Intact  Orientation:  Grossly intact  Reliability:  good  Insight:  Good  Judgement:  Good  Impulse Control:  Good  Physical/Medical Issues:   nothing new reported      Assessment / Plan      Visit Diagnosis/Orders Placed This Visit:  Diagnoses and all orders for this visit:    1. Generalized anxiety disorder (Primary)  -     mirtazapine (REMERON) 15 MG tablet; Take 1 tablet by mouth At Night As Needed (sleep).  Dispense: 30 tablet; Refill: 1    2. Circadian rhythm sleep disorder, unspecified type  -     mirtazapine (REMERON) 15 MG tablet; Take 1 tablet by mouth At Night As Needed (sleep).  Dispense: 30 tablet; Refill: 1    3. Recurrent major depressive disorder, in full remission         Functional Status: No impairment    Prognosis: Good with Ongoing Treatment     Impression/Formulation:  Patient appeared alert and oriented. Patient is receptive to assistance with maintaining a stable lifestyle.  Patient presents with history of     ICD-10-CM ICD-9-CM   1. Generalized anxiety disorder  F41.1 300.02   2. Circadian rhythm sleep disorder,  unspecified type  G47.20 327.30   3. Recurrent major depressive disorder, in full remission  F33.42 296.36     Patient reports improvement with some life stressors, struggles with son relationship.  Sleep is improved, rarely uses mirtazapine.  Plans to start individual therapy again with Edmund Neves.      Treatment Plan:   Continue mirtazapine prn  D/C fluoxetine-pt weaned self off in October 2023  Start therapy with Edmund Neevs.  Patient will continue supportive psychotherapy efforts and medications as indicated. Clinic will obtain release of information for current treatment team for continuity of care as needed. Patient will contact this office, call 911 or present to the nearest emergency room should suicidal or homicidal ideations occur.  Discussed medication options and treatment plan of prescribed medication(s) as well as the risks, benefits, and potential side effects. Patient ackowledged and verbally consented to continue with current treatment plan and was educated on the importance of compliance with treatment and follow-up appointments.     Follow Up:   Return in about 6 months (around 7/15/2024) for Med Check.        RAYSHAWN Daly, ALCIDESP-BC  [facility]     This is electronically signed by RAYSHAWN Daly, ALPHONSO  [unfilled] 10:02 EST

## 2024-01-22 ENCOUNTER — OFFICE VISIT (OUTPATIENT)
Dept: BEHAVIORAL HEALTH | Facility: CLINIC | Age: 54
End: 2024-01-22
Payer: COMMERCIAL

## 2024-01-22 DIAGNOSIS — F33.1 MODERATE EPISODE OF RECURRENT MAJOR DEPRESSIVE DISORDER: Primary | ICD-10-CM

## 2024-01-22 PROCEDURE — 90834 PSYTX W PT 45 MINUTES: CPT | Performed by: SOCIAL WORKER

## 2024-01-22 NOTE — PROGRESS NOTES
"     Initial Adult Note     Date:2024   Patient Name: Kim Gage  : 1970   MRN: [id number]   Time IN: 8:45 am    Time OUT: 9:30 am     Referring Provider: Shirley Virk PA-C    Chief Complaint:      ICD-10-CM ICD-9-CM   1. Moderate episode of recurrent major depressive disorder  F33.1 296.32        History of Present Illness:   Kim Gage is a 53 y.o. male who presents to clinic today for Psychotherapy.  Aleena, wife. Everything is good with Aleena.  Deangelo, son / He has a girlfriend. She has 4 kids of her own.  His girlfriend and her kids live with her parents.   Jessie, grand daughter (3 years old in February)    Client describes, \"Feeling down\" and frustrated with his son.     Need to learn how to let things go with Deangelo.  Deangelo is now working 30 hours per week.  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  That's part of my struggle.  He's barely getting by.   He does not have the same work ethic that I do.    Aleena & I have offered to do a budget with him.   Deangelo blew it off.   He can get a job, but holding a job is the issue.    Did we cause some of his problem?  -Aleena says no  -client thinks otherwise  -Deangelo got everything he wanted.  -he does not understand the value of work or the value of money    Client has worked since he was 13 years old.  Struggled as a child to have basic necessities.    The reason for therapy is that client struggles with being a father and grandfather.  I have to get my mind to accept it if he wants to just get by.  I struggled to raise Deangelo before I met Aleena. I made sure he was provided for.   He's he is supposed to bring us money every two weeks for rent.  He's paid once and then several weeks later.   I'm paying his rent because Deangelo is failing to pay rent as promised.     If it came down to, we would take Jessie in. But we do not desire to be parents to our grand daughter.    \"I don't think I have a right to tell him how to " "live.\"     Past Psychiatric History:   None     Subjective     PHQ-9 Depression Screening  2      COLUMBA-7 Anxiety Screenin     Significant Life Events:   Verbal, physical, sexual abuse? no  Has patient experienced a death / loss of relationship? no  Has patient experienced a major accident or tragic events? no    Legal History:  The patient has no significant history of legal issues.    Education History:   Current level of education: 12th grade  Name of school: [employer]   Has patient experienced any issues or problems at school: Bullied in school  Academic performance:\"Nerdy\"   Enrolled in any extracurricular activities: None  Current hobbies include: Wood Work     Work History:   Highest level of education obtained: 12th grade  Ever been active duty in the ? no  Patient's Occupation: Works at [employer]     Interpersonal/Relational:  Marital Status:   Support system:  Spouse    Mental/Behavioral Health History:  History of prior treatment or hospitalization: Prior out patient counseling  Past diagnoses: Adjustment Disorder  Are there any significant health issues (current or past): Nothing reported   History of seizures: No    Family Psychiatric History:  \"Not that I know of. I don't know my biological father.\"     History of Substance Use:  Patient History:  None.   Family History: None      Triggers: (Persons/Places/Things/Events/Thought/Emotions): Comflicts with son    Social History:   Social History     Socioeconomic History    Marital status:     Number of children: 1   Tobacco Use    Smoking status: Never     Passive exposure: Never    Smokeless tobacco: Never   Vaping Use    Vaping Use: Never used   Substance and Sexual Activity    Alcohol use: Yes     Alcohol/week: 1.0 standard drink of alcohol     Types: 1 Cans of beer per week    Drug use: Never    Sexual activity: Defer        Past Medical History:   Past Medical History:   Diagnosis Date    Depression     Pain in " limb     Plantar fascial fibromatosis     Sleep apnea        Past Surgical History: History reviewed. No pertinent surgical history.    Family History:   Family History   Problem Relation Age of Onset    Heart disease Father     Cancer Maternal Aunt        Medications:     Current Outpatient Medications:     amLODIPine (NORVASC) 10 MG tablet, Take 1 tablet by mouth Daily., Disp: , Rfl:     ammonium lactate (LAC-HYDRIN) 12 % lotion, Every 12 (Twelve) Hours., Disp: , Rfl:     EQ Sinus 12-Hour 120 MG 12 hr tablet, Take 1 tablet by mouth 2 (Two) Times a Day., Disp: , Rfl:     l-methylfolate 15 MG tablet tablet, Take 1 tablet by mouth Daily., Disp: , Rfl:     levocetirizine (XYZAL) 5 MG tablet, Take 1 tablet by mouth once daily, Disp: 90 tablet, Rfl: 0    mirtazapine (REMERON) 15 MG tablet, Take 1 tablet by mouth At Night As Needed (sleep)., Disp: 30 tablet, Rfl: 1    montelukast (Singulair) 10 MG tablet, Take 1 tablet by mouth Every Night., Disp: 90 tablet, Rfl: 1    omeprazole (priLOSEC) 20 MG capsule, TAKE 1 CAPSULE BY MOUTH ONCE DAILY AS DIRECTED 30 MINUTES BEFORE BREAKFAST, Disp: , Rfl:     Xhance 93 MCG/ACT Exhaler Suspension, 2 sprays by Each Nare route 2 (Two) Times a Day. as directed, Disp: , Rfl:     Allergies:   No Known Allergies    Objective     Mental Status Exam:   MENTAL STATUS EXAM   General Appearance:  Cleanly groomed and dressed  Eye Contact:  Good eye contact  Attitude:  Cooperative  Motor Activity:  Normal gait, posture  Muscle Strength:  Normal  Speech:  Normal rate, tone, volume  Language:  Spontaneous  Mood and affect:  Normal, pleasant  Hopelessness:  7  Thought Process:  Logical and goal-directed  Associations/ Thought Content:  No delusions  Hallucinations:  None  Suicidal Ideations:  Not present  Homicidal Ideation:  Not present  Sensorium:  Alert and clear  Orientation:  Person, place, time and situation  Immediate Recall, Recent, and Remote Memory:  Intact  Attention Span/ Concentration:   "Good  Fund of Knowledge:  Appropriate for age and educational level  Intellectual Functioning:  Above average  Insight:  Good  Judgement:  Good  Reliability:  Good  Impulse Control:  Good       SUICIDE RISK ASSESSMENT/CSSRS:  1. Does patient have thoughts of suicide? no  2. Does patient have intent for suicide? no  3. Does patient have a current plan for suicide? no  4. History of suicide attempts: no  5. Family history of suicide or attempts: no  6. History of violent behaviors towards others or property or thoughts of committing suicide: no  7. History of sexual aggression toward others: no  8. Access to firearms or weapons: no    Assessment / Plan      Visit Diagnosis/Orders Placed This Visit:    ICD-10-CM ICD-9-CM   1. Moderate episode of recurrent major depressive disorder  F33.1 296.32        PLAN:  Safety: No acute safety concerns  Risk Assessment: Risk of self-harm acutely is low. Risk of self-harm chronically is also low, but could be further elevated in the event of treatment noncompliance and/or AODA.    Treatment Plan/ Short and Long Term Goals: Continue supportive psychotherapy efforts and medications as indicated. Treatment and medication options discussed during today's visit. Patient ackowledged and verbally consented to continue with current treatment plan and was educated on the importance of compliance with treatment and follow-up appointments. Patient seems reasonably able to adhere to treatment plan.      Assisted Patient in processing above session content; acknowledged and normalized patient’s thoughts, feelings, and concerns.  Rationalized patient thought process regarding his goals for therapy. \"I need help accepting his life. It's not mine to live or control. Getting another opinion - an unbiased opinion. Who am I to tell him how to live it?\" I need help understanding and accepting.   Aleena and I fail to enforce our expectations. Suggested topic: what keeps you from keeping your word in " "holding your so accountable?\"    Allowed Patient to freely discuss issues  without interruption or judgement with unconditional positive regard, active listening skills, and empathy. Therapist provided a safe, confidential environment to facilitate the development of a positive therapeutic relationship and encouraged open, honest communication. Assisted Patient in identifying risk factors which would indicate the need for higher level of care including thoughts to harm self or others and/or self-harming behavior and encouraged Patient to contact this office, call 911, or present to the nearest emergency room should any of these events occur. Discussed crisis intervention services and means to access. Patient adamantly and convincingly denies current suicidal or homicidal ideation or perceptual disturbance. Assisted Patient in processing session content; acknowledged and normalized Patient’s thoughts, feelings, and concerns by utilizing a person-centered approach in efforts to build appropriate rapport and a positive therapeutic relationship with open and honest communication.     Follow Up:   Return in about 2 weeks (around 2/5/2024) for Psychoterapy.    Edmund Neves, ALONSOW, [facility]   "

## 2024-04-18 ENCOUNTER — OFFICE VISIT (OUTPATIENT)
Dept: FAMILY MEDICINE CLINIC | Facility: CLINIC | Age: 54
End: 2024-04-18
Payer: COMMERCIAL

## 2024-04-18 ENCOUNTER — TELEPHONE (OUTPATIENT)
Dept: FAMILY MEDICINE CLINIC | Facility: CLINIC | Age: 54
End: 2024-04-18

## 2024-04-18 VITALS
BODY MASS INDEX: 27.17 KG/M2 | WEIGHT: 205 LBS | DIASTOLIC BLOOD PRESSURE: 74 MMHG | SYSTOLIC BLOOD PRESSURE: 128 MMHG | OXYGEN SATURATION: 97 % | HEART RATE: 72 BPM | HEIGHT: 73 IN

## 2024-04-18 DIAGNOSIS — M25.562 ACUTE PAIN OF LEFT KNEE: Primary | ICD-10-CM

## 2024-04-18 DIAGNOSIS — G47.33 OSA (OBSTRUCTIVE SLEEP APNEA): ICD-10-CM

## 2024-04-18 PROCEDURE — 99214 OFFICE O/P EST MOD 30 MIN: CPT | Performed by: PHYSICIAN ASSISTANT

## 2024-04-18 RX ORDER — CELECOXIB 200 MG/1
200 CAPSULE ORAL 2 TIMES DAILY PRN
Qty: 60 CAPSULE | Refills: 1 | Status: SHIPPED | OUTPATIENT
Start: 2024-04-18

## 2024-04-18 RX ORDER — METHYLPREDNISOLONE 4 MG/1
TABLET ORAL
Qty: 21 TABLET | Refills: 0 | Status: SHIPPED | OUTPATIENT
Start: 2024-04-18

## 2024-04-18 NOTE — TELEPHONE ENCOUNTER
Pt wanted to schedule his physical. He can only do a Monday  not may 6 can we use a spot for that other then physical spot? I can call pt will appt. Please advise a good spot Thank you!!

## 2024-04-18 NOTE — ASSESSMENT & PLAN NOTE
Patient is requesting that I send a prescription for his CPAP supplies to [facility] pharmacy so he can pick those up.  I would be glad to do that but I did tell him that he still needed to schedule an annual physical and come in for blood work etc.

## 2024-04-18 NOTE — ASSESSMENT & PLAN NOTE
I suspect this is a tendonitis, I am going to try celebrex and a medrol dose pack.  And if we do not see improvement we will consider referral to therapy or orthopedics.

## 2024-04-18 NOTE — PROGRESS NOTES
"Chief Complaint  Knee Pain (Left Knee)    Subjective          Kim Gage presents to [facility]    History of Present Illness Patient is reporting an acute episode of left knee pain.  He has been doing a lot of working [place] and may have twisted or hyperextended it not really knowing that he injured it.   He says that putting weight on it in certain positions is painful in the lateral area of the knee and it sort of shoots down toward his ankle flexing and extending the ankle also aggravates the pain around the knee.  He has not seen any swelling, redness or heat.  He also mentioned that he needed his CPAP supplies and an order sent to [facility] pharmacy for that and when he called and asked about it he was told he would need an appointment.    Objective   Vital Signs:   /74 (BP Location: Right arm, Patient Position: Sitting)   Pulse 72   Ht 185.4 cm (73\")   Wt 93 kg (205 lb)   SpO2 97%   BMI 27.05 kg/m²     Body mass index is 27.05 kg/m².    Review of Systems   Constitutional:  Negative for chills and fever.   Respiratory: Negative.     Cardiovascular: Negative.    Musculoskeletal:  Positive for arthralgias. Negative for gait problem and joint swelling.       Past History:  Medical History: has a past medical history of Depression, Pain in limb, Plantar fascial fibromatosis, and Sleep apnea.   Surgical History: has no past surgical history on file.   Family History: family history includes Cancer in his maternal aunt; Heart disease in his father.   Social History: reports that he has never smoked. He has never been exposed to tobacco smoke. He has never used smokeless tobacco. He reports current alcohol use of about 1.0 standard drink of alcohol per week. He reports that he does not use drugs.      Current Outpatient Medications:     amLODIPine (NORVASC) 10 MG tablet, Take 1 tablet by mouth Daily., Disp: , Rfl:     ammonium lactate (LAC-HYDRIN) 12 % lotion, " Every 12 (Twelve) Hours., Disp: , Rfl:     EQ Sinus 12-Hour 120 MG 12 hr tablet, Take 1 tablet by mouth 2 (Two) Times a Day., Disp: , Rfl:     l-methylfolate 15 MG tablet tablet, Take 1 tablet by mouth Daily., Disp: , Rfl:     levocetirizine (XYZAL) 5 MG tablet, Take 1 tablet by mouth once daily, Disp: 90 tablet, Rfl: 0    mirtazapine (REMERON) 15 MG tablet, Take 1 tablet by mouth At Night As Needed (sleep)., Disp: 30 tablet, Rfl: 1    montelukast (Singulair) 10 MG tablet, Take 1 tablet by mouth Every Night., Disp: 90 tablet, Rfl: 1    omeprazole (priLOSEC) 20 MG capsule, TAKE 1 CAPSULE BY MOUTH ONCE DAILY AS DIRECTED 30 MINUTES BEFORE BREAKFAST, Disp: , Rfl:     Xhance 93 MCG/ACT Exhaler Suspension, 2 sprays by Each Nare route 2 (Two) Times a Day. as directed, Disp: , Rfl:     celecoxib (CeleBREX) 200 MG capsule, Take 1 capsule by mouth 2 (Two) Times a Day As Needed for Mild Pain., Disp: 60 capsule, Rfl: 1    methylPREDNISolone (MEDROL) 4 MG dose pack, Take as directed on package instructions., Disp: 21 tablet, Rfl: 0    Allergies: Patient has no known allergies.    Physical Exam  Constitutional:       Appearance: Normal appearance.   Musculoskeletal:      Right knee: Normal.      Left knee: No swelling, deformity, effusion, erythema, ecchymosis or crepitus. Normal range of motion. No tenderness.      Comments: No joint laxity or    Neurological:      Mental Status: He is alert.          Result Review :                   Assessment and Plan    Diagnoses and all orders for this visit:    1. Acute pain of left knee (Primary)  Assessment & Plan:  I suspect this is a tendonitis, I am going to try celebrex and a medrol dose pack.  And if we do not see improvement we will consider referral to therapy or orthopedics.      2. AVANI (obstructive sleep apnea)  Assessment & Plan:  Patient is requesting that I send a prescription for his CPAP supplies to [facility] pharmacy so he can pick those up.  I would be glad to do that but I  did tell him that he still needed to schedule an annual physical and come in for blood work etc.      Other orders  -     methylPREDNISolone (MEDROL) 4 MG dose pack; Take as directed on package instructions.  Dispense: 21 tablet; Refill: 0  -     celecoxib (CeleBREX) 200 MG capsule; Take 1 capsule by mouth 2 (Two) Times a Day As Needed for Mild Pain.  Dispense: 60 capsule; Refill: 1        Follow Up   Return if symptoms worsen or fail to improve, for Set up physical [place] when he's available..  Patient was given instructions and counseling regarding his condition or for health maintenance advice. Please see specific information pulled into the AVS if appropriate.     Shirley Virk PA-C

## 2024-04-29 ENCOUNTER — OFFICE VISIT (OUTPATIENT)
Dept: FAMILY MEDICINE CLINIC | Facility: CLINIC | Age: 54
End: 2024-04-29
Payer: COMMERCIAL

## 2024-04-29 VITALS
DIASTOLIC BLOOD PRESSURE: 76 MMHG | SYSTOLIC BLOOD PRESSURE: 118 MMHG | HEIGHT: 72 IN | WEIGHT: 200.8 LBS | BODY MASS INDEX: 27.2 KG/M2 | HEART RATE: 84 BPM | OXYGEN SATURATION: 98 %

## 2024-04-29 DIAGNOSIS — M25.512 ACUTE PAIN OF LEFT SHOULDER: ICD-10-CM

## 2024-04-29 DIAGNOSIS — Z00.00 ENCOUNTER FOR ROUTINE ADULT MEDICAL EXAMINATION: Primary | ICD-10-CM

## 2024-04-29 PROCEDURE — 99396 PREV VISIT EST AGE 40-64: CPT | Performed by: PHYSICIAN ASSISTANT

## 2024-04-29 PROCEDURE — 99213 OFFICE O/P EST LOW 20 MIN: CPT | Performed by: PHYSICIAN ASSISTANT

## 2024-04-29 RX ORDER — METHOCARBAMOL 750 MG/1
750 TABLET, FILM COATED ORAL NIGHTLY PRN
Qty: 90 TABLET | Refills: 1 | Status: SHIPPED | OUTPATIENT
Start: 2024-04-29

## 2024-04-29 NOTE — ASSESSMENT & PLAN NOTE
Rx for methocarbamol and he can take the celebrex for this as well.  Call or return if symptoms persist or worsen.

## 2024-04-29 NOTE — PROGRESS NOTES
"Chief Complaint  Annual Exam (Patient is not fasting today)    Subjective          Kim Gage presents to [facility]    History of Present Illness Patient  is here today for his annual physical, blood work and he may need a medication refill.  He did report that he was having some left shoulder stiffness and mild discomfort.  He does not have any known injury and he is not taking any medication for it.    Objective   Vital Signs:   /76 (BP Location: Right arm)   Pulse 84   Ht 182.9 cm (72\")   Wt 91.1 kg (200 lb 12.8 oz)   SpO2 98%   BMI 27.23 kg/m²     Body mass index is 27.23 kg/m².    Review of Systems   Constitutional: Negative.  Negative for fatigue.   HENT: Negative.     Eyes: Negative.  Negative for blurred vision.   Respiratory: Negative.  Negative for cough, chest tightness and shortness of breath.    Cardiovascular: Negative.  Negative for chest pain, palpitations and leg swelling.   Gastrointestinal: Negative.  Negative for abdominal pain, constipation, diarrhea, nausea and vomiting.   Endocrine: Negative.    Genitourinary: Negative.    Musculoskeletal:  Positive for back pain (left shoulder area.).   Skin: Negative.    Allergic/Immunologic: Negative.    Neurological:  Negative for dizziness, tremors and headache.   Hematological: Negative.    Psychiatric/Behavioral: Negative.  Negative for depressed mood. The patient is not nervous/anxious.          Past History:  Medical History: has a past medical history of Depression, Pain in limb, Plantar fascial fibromatosis, and Sleep apnea.   Surgical History: has no past surgical history on file.   Family History: family history includes Cancer in his maternal aunt; Heart disease in his father.   Social History: reports that he has never smoked. He has never been exposed to tobacco smoke. He has never used smokeless tobacco. He reports current alcohol use of about 1.0 standard drink of alcohol per week. He reports " that he does not use drugs.      Current Outpatient Medications:     amLODIPine (NORVASC) 10 MG tablet, Take 1 tablet by mouth Daily., Disp: , Rfl:     celecoxib (CeleBREX) 200 MG capsule, Take 1 capsule by mouth 2 (Two) Times a Day As Needed for Mild Pain., Disp: 60 capsule, Rfl: 1    EQ Sinus 12-Hour 120 MG 12 hr tablet, Take 1 tablet by mouth 2 (Two) Times a Day., Disp: , Rfl:     levocetirizine (XYZAL) 5 MG tablet, Take 1 tablet by mouth once daily, Disp: 90 tablet, Rfl: 0    mirtazapine (REMERON) 15 MG tablet, Take 1 tablet by mouth At Night As Needed (sleep)., Disp: 30 tablet, Rfl: 1    montelukast (Singulair) 10 MG tablet, Take 1 tablet by mouth Every Night., Disp: 90 tablet, Rfl: 1    omeprazole (priLOSEC) 20 MG capsule, TAKE 1 CAPSULE BY MOUTH ONCE DAILY AS DIRECTED 30 MINUTES BEFORE BREAKFAST, Disp: , Rfl:     Xhance 93 MCG/ACT Exhaler Suspension, 2 sprays by Each Nare route 2 (Two) Times a Day. as directed, Disp: , Rfl:     methocarbamol (ROBAXIN) 750 MG tablet, Take 1 tablet by mouth At Night As Needed for Muscle Spasms., Disp: 90 tablet, Rfl: 1    Allergies: Patient has no known allergies.    Physical Exam  Vitals reviewed.   Constitutional:       Appearance: Normal appearance.   HENT:      Head: Normocephalic and atraumatic.      Right Ear: Tympanic membrane, ear canal and external ear normal.      Left Ear: Tympanic membrane, ear canal and external ear normal.      Nose: Nose normal.      Mouth/Throat:      Mouth: Mucous membranes are moist.   Eyes:      Extraocular Movements: Extraocular movements intact.      Pupils: Pupils are equal, round, and reactive to light.   Cardiovascular:      Rate and Rhythm: Normal rate and regular rhythm.      Pulses: Normal pulses.      Heart sounds: Normal heart sounds.   Pulmonary:      Effort: Pulmonary effort is normal.      Breath sounds: Normal breath sounds.   Abdominal:      General: Abdomen is flat. Bowel sounds are normal.      Palpations: Abdomen is soft.    Musculoskeletal:         General: Normal range of motion.      Left shoulder: Tenderness present. No swelling, deformity, effusion, bony tenderness or crepitus. Normal range of motion. Normal strength.      Cervical back: Normal range of motion and neck supple.   Skin:     General: Skin is warm and dry.   Neurological:      General: No focal deficit present.      Mental Status: He is alert and oriented to person, place, and time.   Psychiatric:         Mood and Affect: Mood normal.         Behavior: Behavior normal.          Result Review :                   Assessment and Plan    Diagnoses and all orders for this visit:    1. Encounter for routine adult medical examination (Primary)  Assessment & Plan:  Discussed injury prevention, diet and exercise, safe sexual practices, and screening for common diseases. Encouraged use of sunscreen and seatbelts. Discussed timing of colon cancer cancer screening, prostate cancer screening, and review of skin for lesions. Avoidance of tobacco encouraged. Limitation or avoidance of alcohol encouraged. Recommend yearly dental and eye exams. Also discussed monitoring of blood pressure and lipids.     Orders:  -     CBC & Differential; Future  -     Comprehensive Metabolic Panel; Future  -     Hemoglobin A1c; Future  -     Lipid Panel; Future  -     TSH; Future  -     Hepatitis C antibody; Future  -     CBC & Differential  -     Comprehensive Metabolic Panel  -     Hemoglobin A1c  -     Lipid Panel  -     TSH  -     Hepatitis C antibody    2. Acute pain of left shoulder  Assessment & Plan:  Rx for methocarbamol and he can take the celebrex for this as well.  Call or return if symptoms persist or worsen.       Other orders  -     methocarbamol (ROBAXIN) 750 MG tablet; Take 1 tablet by mouth At Night As Needed for Muscle Spasms.  Dispense: 90 tablet; Refill: 1        Follow Up   Return in about 1 year (around 4/29/2025) for Annual physical.  Patient was given instructions and  counseling regarding his condition or for health maintenance advice. Please see specific information pulled into the AVS if appropriate.     Shirley Virk PA-C

## 2024-04-30 LAB
ALBUMIN SERPL-MCNC: 4.7 G/DL (ref 3.8–4.9)
ALBUMIN/GLOB SERPL: 2.4 {RATIO} (ref 1.2–2.2)
ALP SERPL-CCNC: 87 IU/L (ref 44–121)
ALT SERPL-CCNC: 27 IU/L (ref 0–44)
AST SERPL-CCNC: 25 IU/L (ref 0–40)
BASOPHILS # BLD AUTO: 0 X10E3/UL (ref 0–0.2)
BASOPHILS NFR BLD AUTO: 1 %
BILIRUB SERPL-MCNC: 0.7 MG/DL (ref 0–1.2)
BUN SERPL-MCNC: 21 MG/DL (ref 6–24)
BUN/CREAT SERPL: 21 (ref 9–20)
CALCIUM SERPL-MCNC: 9.7 MG/DL (ref 8.7–10.2)
CHLORIDE SERPL-SCNC: 106 MMOL/L (ref 96–106)
CHOLEST SERPL-MCNC: 166 MG/DL (ref 100–199)
CO2 SERPL-SCNC: 22 MMOL/L (ref 20–29)
CREAT SERPL-MCNC: 1 MG/DL (ref 0.76–1.27)
EGFRCR SERPLBLD CKD-EPI 2021: 91 ML/MIN/1.73
EOSINOPHIL # BLD AUTO: 0.1 X10E3/UL (ref 0–0.4)
EOSINOPHIL NFR BLD AUTO: 3 %
ERYTHROCYTE [DISTWIDTH] IN BLOOD BY AUTOMATED COUNT: 12.8 % (ref 11.6–15.4)
GLOBULIN SER CALC-MCNC: 2 G/DL (ref 1.5–4.5)
GLUCOSE SERPL-MCNC: 92 MG/DL (ref 70–99)
HBA1C MFR BLD: 5.3 % (ref 4.8–5.6)
HCT VFR BLD AUTO: 49.2 % (ref 37.5–51)
HCV IGG SERPL QL IA: NON REACTIVE
HDLC SERPL-MCNC: 46 MG/DL
HGB BLD-MCNC: 16.7 G/DL (ref 13–17.7)
IMM GRANULOCYTES # BLD AUTO: 0 X10E3/UL (ref 0–0.1)
IMM GRANULOCYTES NFR BLD AUTO: 0 %
LDLC SERPL CALC-MCNC: 97 MG/DL (ref 0–99)
LYMPHOCYTES # BLD AUTO: 0.9 X10E3/UL (ref 0.7–3.1)
LYMPHOCYTES NFR BLD AUTO: 22 %
MCH RBC QN AUTO: 32.9 PG (ref 26.6–33)
MCHC RBC AUTO-ENTMCNC: 33.9 G/DL (ref 31.5–35.7)
MCV RBC AUTO: 97 FL (ref 79–97)
MONOCYTES # BLD AUTO: 0.3 X10E3/UL (ref 0.1–0.9)
MONOCYTES NFR BLD AUTO: 8 %
NEUTROPHILS # BLD AUTO: 2.7 X10E3/UL (ref 1.4–7)
NEUTROPHILS NFR BLD AUTO: 66 %
PLATELET # BLD AUTO: 185 X10E3/UL (ref 150–450)
POTASSIUM SERPL-SCNC: 5.5 MMOL/L (ref 3.5–5.2)
PROT SERPL-MCNC: 6.7 G/DL (ref 6–8.5)
RBC # BLD AUTO: 5.08 X10E6/UL (ref 4.14–5.8)
SODIUM SERPL-SCNC: 139 MMOL/L (ref 134–144)
TRIGL SERPL-MCNC: 132 MG/DL (ref 0–149)
TSH SERPL DL<=0.005 MIU/L-ACNC: 1.36 UIU/ML (ref 0.45–4.5)
VLDLC SERPL CALC-MCNC: 23 MG/DL (ref 5–40)
WBC # BLD AUTO: 4.1 X10E3/UL (ref 3.4–10.8)

## 2024-07-03 DIAGNOSIS — J32.4 CHRONIC PANSINUSITIS: ICD-10-CM

## 2024-07-03 DIAGNOSIS — J30.1 SEASONAL ALLERGIC RHINITIS DUE TO POLLEN: ICD-10-CM

## 2024-07-03 RX ORDER — LEVOCETIRIZINE DIHYDROCHLORIDE 5 MG/1
5 TABLET, FILM COATED ORAL DAILY
Qty: 90 TABLET | Refills: 0 | Status: SHIPPED | OUTPATIENT
Start: 2024-07-03

## 2024-07-15 ENCOUNTER — OFFICE VISIT (OUTPATIENT)
Dept: BEHAVIORAL HEALTH | Facility: CLINIC | Age: 54
End: 2024-07-15
Payer: COMMERCIAL

## 2024-07-15 VITALS
SYSTOLIC BLOOD PRESSURE: 122 MMHG | DIASTOLIC BLOOD PRESSURE: 80 MMHG | HEART RATE: 88 BPM | OXYGEN SATURATION: 97 % | HEIGHT: 72 IN | BODY MASS INDEX: 27.09 KG/M2 | WEIGHT: 200 LBS

## 2024-07-15 DIAGNOSIS — G47.20 CIRCADIAN RHYTHM SLEEP DISORDER, UNSPECIFIED TYPE: ICD-10-CM

## 2024-07-15 DIAGNOSIS — F41.1 GENERALIZED ANXIETY DISORDER: Primary | ICD-10-CM

## 2024-07-15 PROCEDURE — 99214 OFFICE O/P EST MOD 30 MIN: CPT | Performed by: NURSE PRACTITIONER

## 2024-07-15 RX ORDER — HYDROXYZINE PAMOATE 25 MG/1
25 CAPSULE ORAL 2 TIMES DAILY PRN
Qty: 60 CAPSULE | Refills: 2 | Status: SHIPPED | OUTPATIENT
Start: 2024-07-15

## 2024-07-15 RX ORDER — MIRTAZAPINE 15 MG/1
15 TABLET, FILM COATED ORAL NIGHTLY PRN
Qty: 30 TABLET | Refills: 2 | Status: SHIPPED | OUTPATIENT
Start: 2024-07-15

## 2024-07-15 NOTE — PROGRESS NOTES
Follow Up Office Visit      Patient Name: Kim Gage  : 1970   MRN: [id number]     Referring Provider: Shirley Virk PA-C    Chief Complaint:      ICD-10-CM ICD-9-CM   1. Generalized anxiety disorder  F41.1 300.02   2. Circadian rhythm sleep disorder, unspecified type  G47.20 327.30        History of Present Illness:   Kim Gage is a 54 y.o. male who is here today for follow up with psychiatric medications.    Subjective      Patient Reports:   Still taking the mirtazapine but just as needed.  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  So many things to get done.  Is there something I can take to help with the anxiety those days?    Review of Systems:   Review of Systems   Psychiatric/Behavioral:  Positive for stress.        Screening Scores:   PHQ-9 : 1  COLUMBA-7 : 3    RISK ASSESSMENT:  Patient denies any high risk factors today.    Medications:     Current Outpatient Medications:     amLODIPine (NORVASC) 10 MG tablet, Take 1 tablet by mouth Daily., Disp: , Rfl:     EQ Sinus 12-Hour 120 MG 12 hr tablet, Take 1 tablet by mouth 2 (Two) Times a Day., Disp: , Rfl:     levocetirizine (XYZAL) 5 MG tablet, Take 1 tablet by mouth once daily, Disp: 90 tablet, Rfl: 0    mirtazapine (REMERON) 15 MG tablet, Take 1 tablet by mouth At Night As Needed (sleep)., Disp: 30 tablet, Rfl: 2    montelukast (Singulair) 10 MG tablet, Take 1 tablet by mouth Every Night., Disp: 90 tablet, Rfl: 1    omeprazole (priLOSEC) 20 MG capsule, TAKE 1 CAPSULE BY MOUTH ONCE DAILY AS DIRECTED 30 MINUTES BEFORE BREAKFAST, Disp: , Rfl:     Xhance 93 MCG/ACT Exhaler Suspension, 2 sprays by Each Nare route 2 (Two) Times a Day. as directed, Disp: , Rfl:     hydrOXYzine pamoate (VISTARIL) 25 MG capsule, Take 1 capsule by mouth 2 (Two) Times a Day As Needed for Anxiety., Disp: 60 capsule, Rfl: 2    Medication Considerations:  DANIS reviewed and appropriate.      Allergies:  "  No Known Allergies    Results Reviewed:   screeners     Objective     Physical Exam:  Vital Signs:   Vitals:    07/15/24 0910   BP: 122/80   Pulse: 88   SpO2: 97%   Weight: 90.7 kg (200 lb)   Height: 182.9 cm (72\")     Body mass index is 27.12 kg/m².     Mental Status Exam:   Hygiene:   good  Cooperation:  Cooperative  Eye Contact:  Good  Psychomotor Behavior:  Appropriate  Affect:  Appropriate  Mood: normal  Speech:  Normal  Thought Process:  Goal directed and Linear  Thought Content:  Normal  Suicidal:  None  Homicidal:  None  Hallucinations:  None  Delusion:  None  Memory:  Intact  Orientation:  Grossly intact  Reliability:  good  Insight:  Good  Judgement:  Good  Impulse Control:  Good  Physical/Medical Issues:  No      Assessment / Plan      Visit Diagnosis/Orders Placed This Visit:  Diagnoses and all orders for this visit:    1. Generalized anxiety disorder (Primary)  -     hydrOXYzine pamoate (VISTARIL) 25 MG capsule; Take 1 capsule by mouth 2 (Two) Times a Day As Needed for Anxiety.  Dispense: 60 capsule; Refill: 2  -     mirtazapine (REMERON) 15 MG tablet; Take 1 tablet by mouth At Night As Needed (sleep).  Dispense: 30 tablet; Refill: 2    2. Circadian rhythm sleep disorder, unspecified type  -     mirtazapine (REMERON) 15 MG tablet; Take 1 tablet by mouth At Night As Needed (sleep).  Dispense: 30 tablet; Refill: 2         Functional Status: Mild impairment     Prognosis: Good with Ongoing Treatment     Impression/Formulation:  Patient appeared alert and oriented. Patient is receptive to assistance with maintaining a stable lifestyle.  Patient presents with history of     ICD-10-CM ICD-9-CM   1. Generalized anxiety disorder  F41.1 300.02   2. Circadian rhythm sleep disorder, unspecified type  G47.20 327.30   Patient sleeping normally, occasional use of mirtazapine.  Requests something as needed for anxiety/stress related to certain obligations/responsibilities.    Treatment Plan:   Continue " mirtazapine  Start hydroxyzine prn.    Patient will continue supportive psychotherapy efforts and medications as indicated. Clinic will obtain release of information for current treatment team for continuity of care as needed. Patient will contact this office, call 911 or present to the nearest emergency room should suicidal or homicidal ideations occur.  Discussed medication options and treatment plan of prescribed medication(s) as well as the risks, benefits, and potential side effects. Patient ackowledged and verbally consented to continue with current treatment plan and was educated on the importance of compliance with treatment and follow-up appointments.     Follow Up:   Return in about 6 months (around 1/15/2025) for Med Check.        RAYSHAWN Daly, ALCIDESP-BC  [facility]     This is electronically signed by RAYSHAWN Daly, CANDIDO-BC  [unfilled] 09:39 EDT

## 2024-08-20 DIAGNOSIS — J32.4 CHRONIC PANSINUSITIS: ICD-10-CM

## 2024-08-20 DIAGNOSIS — J30.1 SEASONAL ALLERGIC RHINITIS DUE TO POLLEN: ICD-10-CM

## 2024-08-20 RX ORDER — LEVOCETIRIZINE DIHYDROCHLORIDE 5 MG/1
5 TABLET, FILM COATED ORAL DAILY
Qty: 90 TABLET | Refills: 0 | Status: SHIPPED | OUTPATIENT
Start: 2024-08-20

## 2024-08-20 RX ORDER — MONTELUKAST SODIUM 10 MG/1
10 TABLET ORAL NIGHTLY
Qty: 90 TABLET | Refills: 1 | Status: SHIPPED | OUTPATIENT
Start: 2024-08-20

## 2024-08-20 RX ORDER — OMEPRAZOLE 20 MG/1
20 CAPSULE, DELAYED RELEASE ORAL DAILY
Qty: 90 CAPSULE | Refills: 1 | Status: SHIPPED | OUTPATIENT
Start: 2024-08-20

## 2024-08-26 ENCOUNTER — OFFICE VISIT (OUTPATIENT)
Dept: BEHAVIORAL HEALTH | Facility: CLINIC | Age: 54
End: 2024-08-26
Payer: COMMERCIAL

## 2024-08-26 DIAGNOSIS — F33.1 MODERATE EPISODE OF RECURRENT MAJOR DEPRESSIVE DISORDER: Primary | ICD-10-CM

## 2024-08-26 PROCEDURE — 90837 PSYTX W PT 60 MINUTES: CPT | Performed by: SOCIAL WORKER

## 2024-08-26 NOTE — PROGRESS NOTES
Follow Up  Adult Note     Date:2024   Patient Name: Kim Gage  : 1970   MRN: [id number]   Time IN: 2:00 pom    Time OUT: 3:00 pm      Referring Provider: Shirley Virk PA-C    Chief Complaint:      ICD-10-CM ICD-9-CM   1. Moderate episode of recurrent major depressive disorder  F33.1 296.32        History of Present Illness:   Kim Gage is a 54 y.o. male who presents to clinic today for Psychotherapy.    Present for this session   Client, Kim Flood, wife.   Deangelo, son   Maegan, Deangelo's girlfriend    Jessie, grand daughter (3 years old in February)    Kim   Communication is not great  Understanding his triggers & stressors  Need to improve communication     Deangelo   Feeling of stress   earful  Feeling like \I'm drowning  Everything I do with work and daughter and spending time with  family, it's stressful  It's a lot on me  I'm not good at balancing  When I get overwhelmed, the smallest things makes things topple  Hurt  -one twig in the [place], any second it's going to go   -the pressure comes from within me  -I don't feel like I'm living up my expectations  -drags me down and makes it hard for me feel like I'm achievement    People pleasing  -parents  -girlfriend and her kids  -my daughter  -my good friends  -work     Performance anxiety  -I can get so close to my goal, may be because of self-sabotage  -an example is my new apartment  -feel like a failure  -stupid decision because of the timely to get the apartment    I can't talk about things with my parents because it's hard for me  I don't like disappointing them  If I don't talk about things, it doesn't disappoint them    Kim  -we have absurd expectations  -he continues to let us done  -maybe our expectations are too high     Identified anxiety as big issue since high school   Treated for bipolar (not sure     Subjective     PHQ-9 Depression Screening  Little interest or pleasure in doing things?  0   Feeling down,  depressed, or hopeless?  0   Trouble falling or staying asleep, or sleeping too much?  0   Feeling tired or having little energy?  0   Poor appetite or overeating?  0   Feeling bad about yourself - or that you are a failure or have let yourself or your family down?  0   Trouble concentrating on things, such as reading the newspaper or watching television?  0   Moving or speaking so slowly that other people could have noticed? Or the opposite - being so fidgety or restless that you have been moving around a lot more than usual?  0   Thoughts that you would be better off dead, or of hurting yourself in some way?  0   PHQ-9 Total Score  0   If you checked off any problems, how difficult have these problems made it for you to do your work, take care of things at home, or get along with other people?  Not at all    Previous PHQ-9 Score: 2      COLUMBA-7  Feeling nervous, anxious or on edge  0   Not being able to stop or control worrying  0   Worrying too much about different things  0   Trouble relaxing  0   Being so restless that it is hard to sit still  0   Becoming easily annoyed or irritable  0   Feeling Afraid as if something awful might happen  0   COLUMBA Total Score  0   If you checked any problems, how difficult have these problems made it for you to do your work, take care of things at home or get along with other people?  Not at all    Previous COLUMBA-7 Score: 2    Triggers: (Persons/Places/Things/Events/Thought/Emotions): Family stress / conflict     Medications:     Current Outpatient Medications:     amLODIPine (NORVASC) 10 MG tablet, Take 1 tablet by mouth Daily., Disp: , Rfl:     EQ Sinus 12-Hour 120 MG 12 hr tablet, Take 1 tablet by mouth 2 (Two) Times a Day., Disp: , Rfl:     hydrOXYzine pamoate (VISTARIL) 25 MG capsule, Take 1 capsule by mouth 2 (Two) Times a Day As Needed for Anxiety., Disp: 60 capsule, Rfl: 2    levocetirizine (XYZAL) 5 MG tablet, Take 1 tablet by mouth Daily., Disp: 90 tablet, Rfl: 0     mirtazapine (REMERON) 15 MG tablet, Take 1 tablet by mouth At Night As Needed (sleep)., Disp: 30 tablet, Rfl: 2    montelukast (Singulair) 10 MG tablet, Take 1 tablet by mouth Every Night., Disp: 90 tablet, Rfl: 1    omeprazole (priLOSEC) 20 MG capsule, Take 1 capsule by mouth Daily., Disp: 90 capsule, Rfl: 1    Xhance 93 MCG/ACT Exhaler Suspension, 2 sprays by Each Nare route 2 (Two) Times a Day. as directed, Disp: , Rfl:     Allergies:   No Known Allergies    Objective     MENTAL STATUS EXAM   General Appearance:  Cleanly groomed and dressed  Eye Contact:  Good eye contact  Attitude:  Cooperative  Motor Activity:  Normal gait, posture  Muscle Strength:  Normal  Speech:  Normal rate, tone, volume  Mood and affect:  Frustrated  Hopelessness:  9  Loneliness: 9  Thought Process:  Logical and goal-directed  Associations/ Thought Content:  No delusions  Hallucinations:  None  Suicidal Ideations:  Not present  Homicidal Ideation:  Not present  Sensorium:  Alert and clear  Orientation:  Place, time, situation and person  Immediate Recall, Recent, and Remote Memory:  Intact  Attention Span/ Concentration:  Good  Fund of Knowledge:  Appropriate for age and educational level  Intellectual Functioning:  Above average  Insight:  Good  Judgement:  Good  Reliability:  Good  Impulse Control:  Good     SUICIDE RISK ASSESSMENT/CSSRS:  1. Does patient have thoughts of suicide? no  2. Does patient have intent for suicide? no  3. Does patient have a current plan for suicide? no  4. History of suicide attempts: no  5. Family history of suicide or attempts: no  6. History of violent behaviors towards others or property or thoughts of committing suicide: no  7. History of sexual aggression toward others: no  8. Access to firearms or weapons: no    Assessment / Plan      Visit Diagnosis/Orders Placed This Visit:    ICD-10-CM ICD-9-CM   1. Moderate episode of recurrent major depressive disorder  F33.1 296.32        PLAN:  Safety: No acute  safety concerns  Risk Assessment: Risk of self-harm acutely is low. Risk of self-harm chronically is also low, but could be further elevated in the event of treatment noncompliance and/or AODA.    Treatment Plan/ Short and Long Term Goals: Continue supportive psychotherapy efforts and medications as indicated. Treatment and medication options discussed during today's visit. Patient ackowledged and verbally consented to continue with current treatment plan and was educated on the importance of compliance with treatment and follow-up appointments. Patient seems reasonably able to adhere to treatment plan.      Assisted Patient in processing above session content; acknowledged and normalized patient’s thoughts, feelings, and concerns.  Rationalized patient thought process regarding Communication skills / family counseling / conflict resolution.  Targeted interventions with their sonCarissa    Allowed Patient to freely discuss issues  without interruption or judgement with unconditional positive regard, active listening skills, and empathy. Therapist provided a safe, confidential environment to facilitate the development of a positive therapeutic relationship and encouraged open, honest communication. Assisted Patient in identifying risk factors which would indicate the need for higher level of care including thoughts to harm self or others and/or self-harming behavior and encouraged Patient to contact this office, call 911, or present to the nearest emergency room should any of these events occur. Discussed crisis intervention services and means to access. Patient adamantly and convincingly denies current suicidal or homicidal ideation or perceptual disturbance. Assisted Patient in processing session content; acknowledged and normalized Patient’s thoughts, feelings, and concerns by utilizing a person-centered approach in efforts to build appropriate rapport and a positive therapeutic relationship with open and honest  communication.     Follow Up:   Return if symptoms worsen or fail to improve, for Psychoterapy.    Edmund Neves, LCSW, [facility]

## 2025-01-15 ENCOUNTER — OFFICE VISIT (OUTPATIENT)
Dept: BEHAVIORAL HEALTH | Facility: CLINIC | Age: 55
End: 2025-01-15
Payer: COMMERCIAL

## 2025-01-15 VITALS
SYSTOLIC BLOOD PRESSURE: 124 MMHG | HEART RATE: 88 BPM | WEIGHT: 206 LBS | HEIGHT: 72 IN | BODY MASS INDEX: 27.9 KG/M2 | DIASTOLIC BLOOD PRESSURE: 86 MMHG | OXYGEN SATURATION: 98 %

## 2025-01-15 DIAGNOSIS — G47.20 CIRCADIAN RHYTHM SLEEP DISORDER, UNSPECIFIED TYPE: Primary | ICD-10-CM

## 2025-01-15 DIAGNOSIS — F33.40 RECURRENT MAJOR DEPRESSIVE DISORDER, IN REMISSION: ICD-10-CM

## 2025-01-15 DIAGNOSIS — F41.1 GENERALIZED ANXIETY DISORDER: ICD-10-CM

## 2025-01-15 NOTE — PROGRESS NOTES
Follow Up Office Visit      Patient Name: Kim Gage  : 1970   MRN: [id number]     Referring Provider: Shirley Virk PA-C    Chief Complaint:      ICD-10-CM ICD-9-CM   1. Circadian rhythm sleep disorder, unspecified type  G47.20 327.30   2. Generalized anxiety disorder  F41.1 300.02   3. Recurrent major depressive disorder, in remission  F33.40 296.35        History of Present Illness:   Kim Gage is a 54 y.o. male who is here today for follow up with psychiatric medications.    Subjective      Patient Reports:   Feeling good overall.  Occasional use of the sleep aid.  Dont need any refills today  Haven't had to use the anxiety medication at all.    Review of Systems:   Review of Systems   Psychiatric/Behavioral: Negative.         Screening Scores:   PHQ-9 : 0  COLUMBA-7 : 0    RISK ASSESSMENT:  Patient denies any high risk factors today.    Medications:     Current Outpatient Medications:     amLODIPine (NORVASC) 10 MG tablet, Take 1 tablet by mouth Daily., Disp: , Rfl:     EQ Sinus 12-Hour 120 MG 12 hr tablet, Take 1 tablet by mouth 2 (Two) Times a Day., Disp: , Rfl:     hydrOXYzine pamoate (VISTARIL) 25 MG capsule, Take 1 capsule by mouth 2 (Two) Times a Day As Needed for Anxiety., Disp: 60 capsule, Rfl: 2    levocetirizine (XYZAL) 5 MG tablet, Take 1 tablet by mouth Daily., Disp: 90 tablet, Rfl: 0    mirtazapine (REMERON) 15 MG tablet, Take 1 tablet by mouth At Night As Needed (sleep)., Disp: 30 tablet, Rfl: 2    montelukast (Singulair) 10 MG tablet, Take 1 tablet by mouth Every Night., Disp: 90 tablet, Rfl: 1    omeprazole (priLOSEC) 20 MG capsule, Take 1 capsule by mouth Daily., Disp: 90 capsule, Rfl: 1    Xhance 93 MCG/ACT Exhaler Suspension, 2 sprays by Each Nare route 2 (Two) Times a Day. as directed, Disp: , Rfl:     Medication Considerations:  DANIS reviewed and appropriate.      Allergies:   No Known Allergies    Results Reviewed:   screeners     Objective     Physical  "Exam:  Vital Signs:   Vitals:    01/15/25 1554   BP: 124/86   Pulse: 88   SpO2: 98%   Weight: 93.4 kg (206 lb)   Height: 183 cm (72.05\")     Body mass index is 27.9 kg/m².     Mental Status Exam:   Hygiene:   good  Cooperation:  Cooperative  Eye Contact:  Good  Psychomotor Behavior:  Appropriate  Affect:  Appropriate  Mood: normal  Speech:  Normal  Thought Process:  Goal directed and Linear  Thought Content:  Normal  Suicidal:  None  Homicidal:  None  Hallucinations:  None  Delusion:  None  Memory:  Intact  Orientation:  Grossly intact  Reliability:  good  Insight:  Good  Judgement:  Good  Impulse Control:  Good  Physical/Medical Issues:   nothing reported today      Assessment / Plan      Visit Diagnosis/Orders Placed This Visit:  Diagnoses and all orders for this visit:    1. Circadian rhythm sleep disorder, unspecified type (Primary)    2. Generalized anxiety disorder    3. Recurrent major depressive disorder, in remission         Functional Status: No impairment    Prognosis: Good with Ongoing Treatment     Impression/Formulation:  Patient appeared alert and oriented. Patient is receptive to assistance with maintaining a stable lifestyle.  Patient presents with history of     ICD-10-CM ICD-9-CM   1. Circadian rhythm sleep disorder, unspecified type  G47.20 327.30   2. Generalized anxiety disorder  F41.1 300.02   3. Recurrent major depressive disorder, in remission  F33.40 296.35   Patient screened negative for depression based on a PHQ-9 score of 0 on 1/15/2025. Follow-up recommendations include:  no treatment required today .  Depression in remission, patient has made some changes at home which have alleviated some stress.  He has not yet needed to utilize vistaril for anxiety and he has utilized mirtazapine occasionally to regulate sleep.  He does not need refills today as he is working out of the current refills I sent at our previous appointment.    Treatment Plan:   Continue mirtazapine prn for " sleep  Continue vistaril prn for anxiety    Patient will continue supportive psychotherapy efforts and medications as indicated. Clinic will obtain release of information for current treatment team for continuity of care as needed. Patient will contact this office, call 911 or present to the nearest emergency room should suicidal or homicidal ideations occur.  Discussed medication options and treatment plan of prescribed medication(s) as well as the risks, benefits, and potential side effects. Patient ackowledged and verbally consented to continue with current treatment plan and was educated on the importance of compliance with treatment and follow-up appointments.     Follow Up:   Return in about 1 year (around 1/15/2026) for Med Check.        RAYSHAWN Daly, ALCIDESP-BC  [facility]     This is electronically signed by RAYSHAWN Daly, CANDIDO-BC  [unfilled] 20:28 EST

## 2025-03-20 ENCOUNTER — TELEPHONE (OUTPATIENT)
Dept: FAMILY MEDICINE CLINIC | Facility: CLINIC | Age: 55
End: 2025-03-20
Payer: COMMERCIAL

## 2025-03-20 NOTE — TELEPHONE ENCOUNTER
PATIENT APPT ON 05/05/2025 NEEDED TO BE RESCHEDULE DUE TO PROVIDER BEING OUT OF OFFICE WENT AHEAD AND MOVED THE APPT AND TOLD PATIENT TO CALL BACK TO SEE THE DATE AND TIME OF APPT LEFT A VOICEMAIL

## 2025-06-17 ENCOUNTER — TELEPHONE (OUTPATIENT)
Dept: FAMILY MEDICINE CLINIC | Facility: CLINIC | Age: 55
End: 2025-06-17

## 2025-06-17 NOTE — TELEPHONE ENCOUNTER
Ok to change him back over to me after Faith has retired.  He needs to keep his followup with Faith for August but can go ahead and setup a visit with me for 2026.

## 2025-06-17 NOTE — TELEPHONE ENCOUNTER
Caller: Kim Gage    Relationship: Self    Best call back number: [phone]    What is the best time to reach you: ANY    Who are you requesting to speak with (clinical staff, provider,  specific staff member):     CLINICAL    What was the call regarding:     PATIENT STATES HE HAS ALWAYS BEEN A PATIENT OF [facility]ANKS.  NOT SURE HOW HE GOT CHANGED TO [facility].  HE JUST COULD NEVER GET IN TO SEE LATONYA.  HE DOES NOT WANT TO CHANGE LOCATIONS.  HE WANTS TO CONTINUE TO SEE LATONYA     PLEASE CALL TO LET HIM KNOW IF HE CAN STAY WITH LATONYA

## 2025-08-09 DIAGNOSIS — J32.4 CHRONIC PANSINUSITIS: ICD-10-CM

## 2025-08-09 DIAGNOSIS — J30.1 SEASONAL ALLERGIC RHINITIS DUE TO POLLEN: ICD-10-CM

## 2025-08-11 RX ORDER — LEVOCETIRIZINE DIHYDROCHLORIDE 5 MG/1
5 TABLET, FILM COATED ORAL DAILY
Qty: 90 TABLET | Refills: 0 | Status: SHIPPED | OUTPATIENT
Start: 2025-08-11